# Patient Record
Sex: MALE | Race: WHITE | NOT HISPANIC OR LATINO | URBAN - METROPOLITAN AREA
[De-identification: names, ages, dates, MRNs, and addresses within clinical notes are randomized per-mention and may not be internally consistent; named-entity substitution may affect disease eponyms.]

---

## 2020-10-19 ENCOUNTER — EMERGENCY (EMERGENCY)
Facility: HOSPITAL | Age: 50
LOS: 1 days | Discharge: ANOTHER HOSPITAL | End: 2020-10-19
Attending: EMERGENCY MEDICINE
Payer: COMMERCIAL

## 2020-10-19 ENCOUNTER — INPATIENT (INPATIENT)
Facility: HOSPITAL | Age: 50
LOS: 1 days | Discharge: HOME | End: 2020-10-21
Attending: INTERNAL MEDICINE | Admitting: INTERNAL MEDICINE
Payer: COMMERCIAL

## 2020-10-19 VITALS
OXYGEN SATURATION: 98 % | DIASTOLIC BLOOD PRESSURE: 87 MMHG | SYSTOLIC BLOOD PRESSURE: 143 MMHG | HEART RATE: 92 BPM | RESPIRATION RATE: 16 BRPM

## 2020-10-19 VITALS
DIASTOLIC BLOOD PRESSURE: 102 MMHG | RESPIRATION RATE: 18 BRPM | HEART RATE: 95 BPM | HEIGHT: 71 IN | TEMPERATURE: 98 F | WEIGHT: 249.12 LBS | SYSTOLIC BLOOD PRESSURE: 158 MMHG | OXYGEN SATURATION: 97 %

## 2020-10-19 VITALS — HEIGHT: 70 IN | WEIGHT: 250 LBS

## 2020-10-19 DIAGNOSIS — Z98.890 OTHER SPECIFIED POSTPROCEDURAL STATES: Chronic | ICD-10-CM

## 2020-10-19 DIAGNOSIS — I21.4 NON-ST ELEVATION (NSTEMI) MYOCARDIAL INFARCTION: ICD-10-CM

## 2020-10-19 LAB
ALBUMIN SERPL ELPH-MCNC: 4 G/DL — SIGNIFICANT CHANGE UP (ref 3.5–5)
ALP SERPL-CCNC: 104 U/L — SIGNIFICANT CHANGE UP (ref 40–120)
ALT FLD-CCNC: 50 U/L DA — SIGNIFICANT CHANGE UP (ref 10–60)
ANION GAP SERPL CALC-SCNC: 8 MMOL/L — SIGNIFICANT CHANGE UP (ref 5–17)
APTT BLD: 29.2 SEC — SIGNIFICANT CHANGE UP (ref 27.5–35.5)
AST SERPL-CCNC: 91 U/L — HIGH (ref 10–40)
BASOPHILS # BLD AUTO: 0.03 K/UL — SIGNIFICANT CHANGE UP (ref 0–0.2)
BASOPHILS NFR BLD AUTO: 0.2 % — SIGNIFICANT CHANGE UP (ref 0–2)
BILIRUB SERPL-MCNC: 1 MG/DL — SIGNIFICANT CHANGE UP (ref 0.2–1.2)
BUN SERPL-MCNC: 19 MG/DL — HIGH (ref 7–18)
CALCIUM SERPL-MCNC: 9.3 MG/DL — SIGNIFICANT CHANGE UP (ref 8.4–10.5)
CHLORIDE SERPL-SCNC: 99 MMOL/L — SIGNIFICANT CHANGE UP (ref 96–108)
CK SERPL-CCNC: 824 U/L — HIGH (ref 35–232)
CO2 SERPL-SCNC: 26 MMOL/L — SIGNIFICANT CHANGE UP (ref 22–31)
CREAT SERPL-MCNC: 1.16 MG/DL — SIGNIFICANT CHANGE UP (ref 0.5–1.3)
EOSINOPHIL # BLD AUTO: 0.06 K/UL — SIGNIFICANT CHANGE UP (ref 0–0.5)
EOSINOPHIL NFR BLD AUTO: 0.5 % — SIGNIFICANT CHANGE UP (ref 0–6)
GLUCOSE BLDC GLUCOMTR-MCNC: 202 MG/DL — HIGH (ref 70–99)
GLUCOSE BLDC GLUCOMTR-MCNC: 259 MG/DL — HIGH (ref 70–99)
GLUCOSE SERPL-MCNC: 337 MG/DL — HIGH (ref 70–99)
HCT VFR BLD CALC: 47.8 % — SIGNIFICANT CHANGE UP (ref 39–50)
HGB BLD-MCNC: 16.7 G/DL — SIGNIFICANT CHANGE UP (ref 13–17)
IMM GRANULOCYTES NFR BLD AUTO: 0.2 % — SIGNIFICANT CHANGE UP (ref 0–1.5)
INR BLD: 0.89 RATIO — SIGNIFICANT CHANGE UP (ref 0.88–1.16)
LYMPHOCYTES # BLD AUTO: 2.52 K/UL — SIGNIFICANT CHANGE UP (ref 1–3.3)
LYMPHOCYTES # BLD AUTO: 20.6 % — SIGNIFICANT CHANGE UP (ref 13–44)
MCHC RBC-ENTMCNC: 29.1 PG — SIGNIFICANT CHANGE UP (ref 27–34)
MCHC RBC-ENTMCNC: 34.9 GM/DL — SIGNIFICANT CHANGE UP (ref 32–36)
MCV RBC AUTO: 83.4 FL — SIGNIFICANT CHANGE UP (ref 80–100)
MONOCYTES # BLD AUTO: 0.79 K/UL — SIGNIFICANT CHANGE UP (ref 0–0.9)
MONOCYTES NFR BLD AUTO: 6.5 % — SIGNIFICANT CHANGE UP (ref 2–14)
NEUTROPHILS # BLD AUTO: 8.79 K/UL — HIGH (ref 1.8–7.4)
NEUTROPHILS NFR BLD AUTO: 72 % — SIGNIFICANT CHANGE UP (ref 43–77)
NRBC # BLD: 0 /100 WBCS — SIGNIFICANT CHANGE UP (ref 0–0)
PLATELET # BLD AUTO: 237 K/UL — SIGNIFICANT CHANGE UP (ref 150–400)
POTASSIUM SERPL-MCNC: 4 MMOL/L — SIGNIFICANT CHANGE UP (ref 3.5–5.3)
POTASSIUM SERPL-SCNC: 4 MMOL/L — SIGNIFICANT CHANGE UP (ref 3.5–5.3)
PROT SERPL-MCNC: 8.4 G/DL — HIGH (ref 6–8.3)
PROTHROM AB SERPL-ACNC: 10.6 SEC — SIGNIFICANT CHANGE UP (ref 10.6–13.6)
RBC # BLD: 5.73 M/UL — SIGNIFICANT CHANGE UP (ref 4.2–5.8)
RBC # FLD: 13 % — SIGNIFICANT CHANGE UP (ref 10.3–14.5)
SARS-COV-2 RNA SPEC QL NAA+PROBE: SIGNIFICANT CHANGE UP
SODIUM SERPL-SCNC: 133 MMOL/L — LOW (ref 135–145)
TROPONIN I SERPL-MCNC: 13.5 NG/ML — HIGH (ref 0–0.04)
WBC # BLD: 12.21 K/UL — HIGH (ref 3.8–10.5)
WBC # FLD AUTO: 12.21 K/UL — HIGH (ref 3.8–10.5)

## 2020-10-19 PROCEDURE — 92941 PRQ TRLML REVSC TOT OCCL AMI: CPT | Mod: LC

## 2020-10-19 PROCEDURE — 93005 ELECTROCARDIOGRAM TRACING: CPT

## 2020-10-19 PROCEDURE — 87635 SARS-COV-2 COVID-19 AMP PRB: CPT

## 2020-10-19 PROCEDURE — 93010 ELECTROCARDIOGRAM REPORT: CPT | Mod: 77

## 2020-10-19 PROCEDURE — 36415 COLL VENOUS BLD VENIPUNCTURE: CPT

## 2020-10-19 PROCEDURE — 80053 COMPREHEN METABOLIC PANEL: CPT

## 2020-10-19 PROCEDURE — 85610 PROTHROMBIN TIME: CPT

## 2020-10-19 PROCEDURE — 85025 COMPLETE CBC W/AUTO DIFF WBC: CPT

## 2020-10-19 PROCEDURE — 71046 X-RAY EXAM CHEST 2 VIEWS: CPT

## 2020-10-19 PROCEDURE — 99152 MOD SED SAME PHYS/QHP 5/>YRS: CPT

## 2020-10-19 PROCEDURE — 82550 ASSAY OF CK (CPK): CPT

## 2020-10-19 PROCEDURE — 93458 L HRT ARTERY/VENTRICLE ANGIO: CPT | Mod: 26,59

## 2020-10-19 PROCEDURE — 99291 CRITICAL CARE FIRST HOUR: CPT | Mod: 25

## 2020-10-19 PROCEDURE — 99291 CRITICAL CARE FIRST HOUR: CPT

## 2020-10-19 PROCEDURE — 85730 THROMBOPLASTIN TIME PARTIAL: CPT

## 2020-10-19 PROCEDURE — 84484 ASSAY OF TROPONIN QUANT: CPT

## 2020-10-19 PROCEDURE — 71046 X-RAY EXAM CHEST 2 VIEWS: CPT | Mod: 26

## 2020-10-19 PROCEDURE — 93010 ELECTROCARDIOGRAM REPORT: CPT | Mod: 59

## 2020-10-19 RX ORDER — SODIUM CHLORIDE 9 MG/ML
1000 INJECTION, SOLUTION INTRAVENOUS
Refills: 0 | Status: DISCONTINUED | OUTPATIENT
Start: 2020-10-19 | End: 2020-10-21

## 2020-10-19 RX ORDER — ACETAMINOPHEN 500 MG
650 TABLET ORAL ONCE
Refills: 0 | Status: COMPLETED | OUTPATIENT
Start: 2020-10-19 | End: 2020-10-19

## 2020-10-19 RX ORDER — INSULIN LISPRO 100/ML
VIAL (ML) SUBCUTANEOUS
Refills: 0 | Status: DISCONTINUED | OUTPATIENT
Start: 2020-10-19 | End: 2020-10-20

## 2020-10-19 RX ORDER — GLUCAGON INJECTION, SOLUTION 0.5 MG/.1ML
1 INJECTION, SOLUTION SUBCUTANEOUS ONCE
Refills: 0 | Status: DISCONTINUED | OUTPATIENT
Start: 2020-10-19 | End: 2020-10-21

## 2020-10-19 RX ORDER — DEXTROSE 50 % IN WATER 50 %
25 SYRINGE (ML) INTRAVENOUS ONCE
Refills: 0 | Status: DISCONTINUED | OUTPATIENT
Start: 2020-10-19 | End: 2020-10-21

## 2020-10-19 RX ORDER — TICAGRELOR 90 MG/1
90 TABLET ORAL EVERY 12 HOURS
Refills: 0 | Status: DISCONTINUED | OUTPATIENT
Start: 2020-10-20 | End: 2020-10-21

## 2020-10-19 RX ORDER — DEXTROSE 50 % IN WATER 50 %
12.5 SYRINGE (ML) INTRAVENOUS ONCE
Refills: 0 | Status: DISCONTINUED | OUTPATIENT
Start: 2020-10-19 | End: 2020-10-21

## 2020-10-19 RX ORDER — ASPIRIN/CALCIUM CARB/MAGNESIUM 324 MG
162 TABLET ORAL ONCE
Refills: 0 | Status: COMPLETED | OUTPATIENT
Start: 2020-10-19 | End: 2020-10-19

## 2020-10-19 RX ORDER — SODIUM CHLORIDE 9 MG/ML
3 INJECTION INTRAMUSCULAR; INTRAVENOUS; SUBCUTANEOUS EVERY 8 HOURS
Refills: 0 | Status: DISCONTINUED | OUTPATIENT
Start: 2020-10-19 | End: 2020-10-21

## 2020-10-19 RX ORDER — DEXTROSE 50 % IN WATER 50 %
15 SYRINGE (ML) INTRAVENOUS ONCE
Refills: 0 | Status: DISCONTINUED | OUTPATIENT
Start: 2020-10-19 | End: 2020-10-21

## 2020-10-19 RX ORDER — ATORVASTATIN CALCIUM 80 MG/1
80 TABLET, FILM COATED ORAL ONCE
Refills: 0 | Status: COMPLETED | OUTPATIENT
Start: 2020-10-19 | End: 2020-10-19

## 2020-10-19 RX ORDER — SODIUM CHLORIDE 9 MG/ML
1000 INJECTION INTRAMUSCULAR; INTRAVENOUS; SUBCUTANEOUS
Refills: 0 | Status: DISCONTINUED | OUTPATIENT
Start: 2020-10-19 | End: 2020-10-21

## 2020-10-19 RX ORDER — ASPIRIN/CALCIUM CARB/MAGNESIUM 324 MG
81 TABLET ORAL DAILY
Refills: 0 | Status: DISCONTINUED | OUTPATIENT
Start: 2020-10-19 | End: 2020-10-21

## 2020-10-19 RX ORDER — METOPROLOL TARTRATE 50 MG
25 TABLET ORAL
Refills: 0 | Status: DISCONTINUED | OUTPATIENT
Start: 2020-10-19 | End: 2020-10-21

## 2020-10-19 RX ORDER — ATORVASTATIN CALCIUM 80 MG/1
80 TABLET, FILM COATED ORAL AT BEDTIME
Refills: 0 | Status: DISCONTINUED | OUTPATIENT
Start: 2020-10-19 | End: 2020-10-21

## 2020-10-19 RX ORDER — INSULIN LISPRO 100/ML
VIAL (ML) SUBCUTANEOUS AT BEDTIME
Refills: 0 | Status: DISCONTINUED | OUTPATIENT
Start: 2020-10-19 | End: 2020-10-20

## 2020-10-19 RX ADMIN — ATORVASTATIN CALCIUM 80 MILLIGRAM(S): 80 TABLET, FILM COATED ORAL at 22:24

## 2020-10-19 RX ADMIN — ATORVASTATIN CALCIUM 80 MILLIGRAM(S): 80 TABLET, FILM COATED ORAL at 14:41

## 2020-10-19 RX ADMIN — Medication 25 MILLIGRAM(S): at 18:57

## 2020-10-19 RX ADMIN — Medication 162 MILLIGRAM(S): at 14:41

## 2020-10-19 RX ADMIN — Medication 2: at 18:54

## 2020-10-19 RX ADMIN — Medication 650 MILLIGRAM(S): at 23:20

## 2020-10-19 RX ADMIN — SODIUM CHLORIDE 75 MILLILITER(S): 9 INJECTION INTRAMUSCULAR; INTRAVENOUS; SUBCUTANEOUS at 18:46

## 2020-10-19 RX ADMIN — Medication 1: at 21:56

## 2020-10-19 RX ADMIN — Medication 650 MILLIGRAM(S): at 22:21

## 2020-10-19 RX ADMIN — SODIUM CHLORIDE 3 MILLILITER(S): 9 INJECTION INTRAMUSCULAR; INTRAVENOUS; SUBCUTANEOUS at 21:36

## 2020-10-19 NOTE — ED ADULT NURSE NOTE - CHPI ED NUR SYMPTOMS NEG
no fever/no syncope/no shortness of breath/no chills/no congestion/no diaphoresis/no nausea/no vomiting/no dizziness/no back pain

## 2020-10-19 NOTE — ED ADULT TRIAGE NOTE - CHIEF COMPLAINT QUOTE
sent from urgent care for abnormal ekg . pt reports chest discomfort  x 5 days seen  previously at the urgent care  4 days ago went back today  states  the discomfort radiated  from  the chest to the jaw

## 2020-10-19 NOTE — ED PROVIDER NOTE - PROGRESS NOTE DETAILS
Patient having mild intermittent L sided chest pain, last episode 10 minutes ago. Repeat EKG. Case discussed with Dr. Murrieta; will transfer patient for cath.

## 2020-10-19 NOTE — H&P CARDIOLOGY - COMMENTS
1. NSTEMI  - Plan for cardiac cath today (pt consented, risks/benefits explained, questions answered). Continue ASA and statin therapy. Will start DAPT if stent placed. Likely eventual beta blocker and ACE.  2. Hypertensive urgency  - Start Metoprolol 25mg PO BID. Uptitrate as needed. Eventual ACE. Monitor BP serially. Low sodium diet.  3. Diabetes mellitus  - Likely undiagnosed DM. Check A1C. Start insulin sliding scale and monitor finger sticks. May require endo consult.

## 2020-10-19 NOTE — CONSULT NOTE ADULT - PROBLEM SELECTOR RECOMMENDATION 9
Chest pain ACS-NSTEMI  Elevated troponins with ECG changes and active angina  Transfer for urgent Cardiac cath-EMERY Baum  Aspirin,statins

## 2020-10-19 NOTE — CHART NOTE - NSCHARTNOTEFT_GEN_A_CORE
Status post Cath, Right Radial site without bleeding or hematoma.  Dressing is intact.  Positive Pulses, Capillary refill less than two seconds.  Will continue to monitor.                                                                                                                                                  JUAN Khan, RPA-C [id number]

## 2020-10-19 NOTE — H&P CARDIOLOGY - RS GEN PE MLT RESP DETAILS PC
good air movement/airway patent/clear to auscultation bilaterally/no rhonchi/no wheezes/no intercostal retractions/no chest wall tenderness/breath sounds equal/no rales/respirations non-labored

## 2020-10-19 NOTE — ED PROVIDER NOTE - CLINICAL SUMMARY MEDICAL DECISION MAKING FREE TEXT BOX
49 y/o M presents with chest pain for a few days. Troponin is very elevated. Will consult cardiology.

## 2020-10-19 NOTE — ED PROVIDER NOTE - OBJECTIVE STATEMENT
49 y/o M with no known PMHx, has not seen his doctor in a few years, presents to the ED with several days of chest pain. Patient went to urgent care a few days ago, had a normal EKG and was sent for an outpatient echo, which he was told was normal. Patient also scheduled for an outpatient cardiology appointment tomorrow morning but went back to urgent care today for persisted pain. Patient had EKG repeated which was "possibly abnormal" and was then sent to the ER. 51 y/o M with no known PMHx, has not seen his doctor in a few years, presents to the ED with several days of chest pain. Patient went to urgent care a few days ago, had a normal EKG and was sent for an outpatient echo, which he was told was normal. Patient also scheduled for an outpatient cardiology appointment tomorrow morning but went back to urgent care today for persisted pain and now pain in both sides of jaw.  Patient had EKG repeated which was "possibly abnormal" and was then sent to the ER.

## 2020-10-19 NOTE — CONSULT NOTE ADULT - SUBJECTIVE AND OBJECTIVE BOX
DATE OF SERVICE: 10/19/2020  Patient was seen,examined and evaluated  by me.    CHIEF COMPLAINT:Chest Pain     HPI:  51 y/o M with no known PMHx, has not seen his doctor in a few years, presents to the ED with several days of chest pain. Patient went to urgent care a few days ago, had a normal EKG and was sent for an outpatient echo, which he was told was normal. Patient also scheduled for an outpatient cardiology appointment tomorrow morning but went back to urgent care today for persisted pain and now pain in both sides of jaw.     PAST MEDICAL & SURGICAL HISTORY:  Tobacco abuse    S/P arthroscopy of right knee    S/P correction of deviated nasal septum        MEDICATIONS  (STANDING):    MEDICATIONS  (PRN):      FAMILY HISTORY:  Family history of MI (myocardial infarction) (Father)      No family history of premature coronary artery disease or sudden cardiac death    SOCIAL HISTORY:  Smoking-[ ] Active  [ ] Former [ ] Non Smoker  Alcohol-[ ] Denies [ ] Social [ ] Daily  Ilicit Drug use-[ ] Denies [ ] Active user    REVIEW OF SYSTEMS:  Constitutional: [ ] fever, [ ]weight loss, [ ]fatigue   Activity [ ] Bedbound,[ ] Ambulates [ ] Unassisted[ ] Cane/Walker [ ] Assistence.  Effort tolerance:[ ] Excellent [ ] Good [ ] Fair [ ] Poor [ ]  Eyes: [ ] visual changes  Respiratory: [ ]shortness of breath;  [ ] cough, [ ]wheezing, [ ]chills, [ ]hemoptysis  Cardiovascular: [ ] chest pain, [ ]palpitations, [ ]dizziness,  [ ]leg swelling[ ]orthopnea [ ]PND  Gastrointestinal: [ ] abdominal pain, [ ]nausea, [ ]vomiting,  [ ]diarrhea,[ ]constipation  Genitourinary: [ ] dysuria, [ ] hematuria  Neurologic: [ ] headaches [ ] tremors[ ] weakness  Skin: [ ] itching, [ ]burning, [ ] rashes  Endocrine: [ ] heat or cold intolerance  Musculoskeletal: [ ] joint pain or swelling; [ ] muscle, back, or extremity pain  Psychiatric: [ ] depression, [ ]anxiety, [ ]mood swings, or [ ]difficulty sleeping  Hematologic: [ ] easy bruising, [ ] bleeding gums       [ x] All others negative	  [ ] Unable to obtain    Vital Signs Last 24 Hrs  T(C): 36.6 (19 Oct 2020 14:05), Max: 36.7 (19 Oct 2020 13:06)  T(F): 97.8 (19 Oct 2020 14:05), Max: 98.1 (19 Oct 2020 13:06)  HR: 92 (19 Oct 2020 14:47) (92 - 96)  BP: 143/87 (19 Oct 2020 14:47) (136/82 - 158/102)  BP(mean): --  RR: 16 (19 Oct 2020 14:47) (16 - 18)  SpO2: 98% (19 Oct 2020 14:47) (97% - 98%)  I&O's Summary      PHYSICAL EXAM:  General: No acute distress BMI-  HEENT: EOMI, PERRL[ ] Icteric  Neck: Supple, No JVD  Lungs: Equal air entry bilaterally; [ ] Rales [ ] Rhonchi [ ] Wheezing  Heart: Regular rate and rhythm;[ ] Murmurs-   /6 [ ] Systolic [ ] Diastolic [ ] Radiation,No rubs, or gallops  Abdomen: Nontender, bowel sounds present  Extremities: No clubbing, cyanosis, or edema[ ] Calf tenderness  Nervous system:  Alert & Oriented X3, no focal deficits  Psychiatric: Normal affect  Skin: No rashes or lesions      LABS:  10-19    133<L>  |  99  |  19<H>  ----------------------------<  337<H>  4.0   |  26  |  1.16    Ca    9.3      19 Oct 2020 13:43    TPro  8.4<H>  /  Alb  4.0  /  TBili  1.0  /  DBili  x   /  AST  91<H>  /  ALT  50  /  AlkPhos  104  10-19    Creatinine Trend: 1.16<--                        16.7   12.21 )-----------( 237      ( 19 Oct 2020 13:43 )             47.8     PT/INR - ( 19 Oct 2020 13:43 )   PT: 10.6 sec;   INR: 0.89 ratio         PTT - ( 19 Oct 2020 13:43 )  PTT:29.2 sec    Lipid Panel:   Cardiac Enzymes: CARDIAC MARKERS ( 19 Oct 2020 13:43 )  13.500 ng/mL / x     / 824 U/L / x     / x                RADIOLOGY:    ECG [my interpretation]:    TELEMETRY:    ECHO:    STRESS TEST:    CATHETERIZATION: DATE OF SERVICE: 10/19/2020  Patient was seen,examined and evaluated  by me.    CHIEF COMPLAINT:Chest Pain     HPI:51 y/o M with no known PMHx, has not seen his doctor in a few years, presents to the ED with several days of chest pain. Patient went to urgent care a few days ago, had a normal EKG and was sent for an outpatient echo, which he was told was normal. Patient also scheduled for an outpatient cardiology appointment tomorrow morning but went back to urgent care today for persisted chest  pain and now pain in both sides of jaw. ECG revealed ST depressions refers to anterior chest pressure,initial Troponins elevated at 13,    PAST MEDICAL & SURGICAL HISTORY:  Tobacco abuse  S/P arthroscopy of right knee  S/P correction of deviated nasal septum    ALLERGIES AND HOME MEDICATIONS:   Allergies:        Allergies:No Known Allergies:     Home Medications:   None      FAMILY HISTORY:  Family history of MI (myocardial infarction) (Father)  No family history of premature coronary artery disease or sudden cardiac death    SOCIAL HISTORY:  Smoking-[ ] Active  [x ] Former [ ] Non Smoker  Alcohol-[ ] Denies [x ] Social [ ] Daily  Ilicit Drug use-[x ] Denies [ ] Active user    REVIEW OF SYSTEMS:  Constitutional: [ ] fever, [ ]weight loss, [ ]fatigue   Activity [ ] Bedbound,[x ] Ambulates [x ] Unassisted[ ] Cane/Walker [ ] Assistence.  Effort tolerance:[x ] Excellent [ ] Good [ ] Fair [ ] Poor [ ]  Eyes: [ ] visual changes  Respiratory: [x ]shortness of breath;  [ ] cough, [ ]wheezing, [ ]chills, [ ]hemoptysis  Cardiovascular: [x ] chest pain, [ ]palpitations, [ ]dizziness,  [ ]leg swelling[ ]orthopnea [ ]PND  Gastrointestinal: [ ] abdominal pain, [ ]nausea, [ ]vomiting,  [ ]diarrhea,[ ]constipation  Genitourinary: [ ] dysuria, [ ] hematuria  Neurologic: [ ] headaches [ ] tremors[ ] weakness  Skin: [ ] itching, [ ]burning, [ ] rashes  Endocrine: [ ] heat or cold intolerance  Musculoskeletal: [ ] joint pain or swelling; [ ] muscle, back, or extremity pain  Psychiatric: [ ] depression, [ ]anxiety, [ ]mood swings, or [ ]difficulty sleeping  Hematologic: [ ] easy bruising, [ ] bleeding gums       [ x] All others negative	  [ ] Unable to obtain    Vital Signs Last 24 Hrs  T(C): 36.6 (19 Oct 2020 14:05), Max: 36.7 (19 Oct 2020 13:06)  T(F): 97.8 (19 Oct 2020 14:05), Max: 98.1 (19 Oct 2020 13:06)  HR: 92 (19 Oct 2020 14:47) (92 - 96)  BP: 143/87 (19 Oct 2020 14:47) (136/82 - 158/102)  RR: 16 (19 Oct 2020 14:47) (16 - 18)  SpO2: 98% (19 Oct 2020 14:47) (97% - 98%)  I&O's Summary      PHYSICAL EXAM:  General: No acute distress BMI-34.8  HEENT: EOMI, PERRL[ ] Icteric  Neck: Supple, No JVD  Lungs: Equal air entry bilaterally; [ ] Rales [ ] Rhonchi [ ] Wheezing  Heart: Regular rate and rhythm;[x ] Murmurs-   2/6 [x ] Systolic [ ] Diastolic [ ] Radiation,No rubs, or gallops  Abdomen: Nontender, bowel sounds present  Extremities: No clubbing, cyanosis, or edema[ ] Calf tenderness  Nervous system:  Alert & Oriented X3, no focal deficits  Psychiatric: Normal affect  Skin: No rashes or lesions      LABS:  10-19    133<L>  |  99  |  19<H>  ----------------------------<  337<H>  4.0   |  26  |  1.16    Ca    9.3      19 Oct 2020 13:43    TPro  8.4<H>  /  Alb  4.0  /  TBili  1.0  /  DBili  x   /  AST  91<H>  /  ALT  50  /  AlkPhos  104  10-19    Creatinine Trend: 1.16<--                        16.7   12.21 )-----------( 237      ( 19 Oct 2020 13:43 )             47.8     PT/INR - ( 19 Oct 2020 13:43 )   PT: 10.6 sec;   INR: 0.89 ratio         PTT - ( 19 Oct 2020 13:43 )  PTT:29.2 sec    Cardiac Enzymes: CARDIAC MARKERS ( 19 Oct 2020 13:43 )  13.500 ng/mL / x     / 824 U/L / x     / x                RADIOLOGY XR CHEST PA LAT 2V            IMPRESSION:  The lungs are clear.  No pleural abnormality is seen.  The heart and mediastinum appear intact.    ECG [my interpretation]:Sinus rhythm at 99 BPM ST depression V2-3    TELEMETRY:Sinus rhythm sinus tachycardia

## 2020-10-19 NOTE — H&P CARDIOLOGY - HISTORY OF PRESENT ILLNESS
49 y/o male, former tobacco abuse, with no significant PMHx, not currently taking any medications, presented to [facility] with chest pain. Pt initially developed a mild, non-pleuritic, non-exertional, non-radiating, left and right sided chest pain 4 days ago on Thursday, October 15. Pt cannot recall what provoked his chest pain, although he did a lot of strenuous physical activity at his job last week. Pt reports that his chest pain worsened slightly the next day, which caused him to go to urgent care. At that time, his EKG was normal and was referred to a cardiologist and prescribed Flexeril, as it was initially thought that his chest pain was muscular. Throughout the weekend, his chest pain worsened to a maximum intensity of 6/10. Pt still cannot recall if anything specifically provoked his chest pain as he denies any exertional or positional component. Pt went back to urgent care this morning and his EKG reportedly had new changes, which is why he was referred to [facility] for evaluation. Pt has never had a stress test or angiogram in the past. Pt does not have any chest pain at this time. Pt denies fever, chills, recent travel, headache, dizziness, visual deficits, shortness of breath, orthopnea, palpitations, abdominal pain, N/V/D/C, hematochezia, melena, dysuria, hematuria, LOC, syncope, peripheral edema. Upon arrival to [facility], EKG revealed NSR with ST depressions in the anterior leads with a troponin I of 13.5 and . Pt was given ASA and transferred to [facility] for cardiac catheterization.

## 2020-10-19 NOTE — H&P CARDIOLOGY - NEUROLOGICAL DETAILS
responds to verbal commands/alert and oriented x 3/responds to pain/normal strength/sensation intact/cranial nerves intact

## 2020-10-19 NOTE — ED ADULT NURSE NOTE - OBJECTIVE STATEMENT
As per pt, c/o generalize CP since friday worsening today with localized L sided CP since this morning and jaw pain since last night pain radiating to BL UE L>R. As per pt, he went to [place] urgent care today and was sent to the hospital from urgent care for abnormal EKG today. As per pt, the CP is pressure-like. Pt denies SOB, n/v/d, cough, f/c, H/A, dizziness, or blurred vision.

## 2020-10-19 NOTE — H&P CARDIOLOGY - NEGATIVE CARDIOVASCULAR SYMPTOMS
no peripheral edema/no orthopnea/no paroxysmal nocturnal dyspnea/no dyspnea on exertion/no palpitations/no claudication

## 2020-10-19 NOTE — CONSULT NOTE ADULT - ATTENDING COMMENTS
Patient was seen and examined by me on 10/19/2020,interim events noted,labs and radiology studies reviewed.  Derik Murrieta MD,FACC.  [address] [place] [address].  [id number].  [phone]

## 2020-10-20 ENCOUNTER — TRANSCRIPTION ENCOUNTER (OUTPATIENT)
Age: 50
End: 2020-10-20

## 2020-10-20 DIAGNOSIS — E11.65 TYPE 2 DIABETES MELLITUS WITH HYPERGLYCEMIA: ICD-10-CM

## 2020-10-20 DIAGNOSIS — I16.0 HYPERTENSIVE URGENCY: ICD-10-CM

## 2020-10-20 DIAGNOSIS — I10 ESSENTIAL (PRIMARY) HYPERTENSION: ICD-10-CM

## 2020-10-20 DIAGNOSIS — E78.49 OTHER HYPERLIPIDEMIA: ICD-10-CM

## 2020-10-20 DIAGNOSIS — I21.4 NON-ST ELEVATION (NSTEMI) MYOCARDIAL INFARCTION: ICD-10-CM

## 2020-10-20 LAB
ALBUMIN SERPL ELPH-MCNC: 4.3 G/DL — SIGNIFICANT CHANGE UP (ref 3.3–5)
ALP SERPL-CCNC: 90 U/L — SIGNIFICANT CHANGE UP (ref 40–120)
ALT FLD-CCNC: 33 U/L — SIGNIFICANT CHANGE UP (ref 4–41)
ANION GAP SERPL CALC-SCNC: 16 MMO/L — HIGH (ref 7–14)
ANION GAP SERPL CALC-SCNC: 17 MMO/L — HIGH (ref 7–14)
AST SERPL-CCNC: 60 U/L — HIGH (ref 4–40)
B-OH-BUTYR SERPL-SCNC: 0.5 MMOL/L — HIGH (ref 0–0.4)
BASOPHILS # BLD AUTO: 0.02 K/UL — SIGNIFICANT CHANGE UP (ref 0–0.2)
BASOPHILS NFR BLD AUTO: 0.2 % — SIGNIFICANT CHANGE UP (ref 0–2)
BILIRUB SERPL-MCNC: 1.7 MG/DL — HIGH (ref 0.2–1.2)
BUN SERPL-MCNC: 15 MG/DL — SIGNIFICANT CHANGE UP (ref 7–23)
BUN SERPL-MCNC: 19 MG/DL — SIGNIFICANT CHANGE UP (ref 7–23)
CALCIUM SERPL-MCNC: 8.8 MG/DL — SIGNIFICANT CHANGE UP (ref 8.4–10.5)
CALCIUM SERPL-MCNC: 9.2 MG/DL — SIGNIFICANT CHANGE UP (ref 8.4–10.5)
CHLORIDE SERPL-SCNC: 100 MMOL/L — SIGNIFICANT CHANGE UP (ref 98–107)
CHLORIDE SERPL-SCNC: 97 MMOL/L — LOW (ref 98–107)
CHOLEST SERPL-MCNC: 226 MG/DL — HIGH (ref 120–199)
CO2 SERPL-SCNC: 20 MMOL/L — LOW (ref 22–31)
CO2 SERPL-SCNC: 20 MMOL/L — LOW (ref 22–31)
CREAT SERPL-MCNC: 0.8 MG/DL — SIGNIFICANT CHANGE UP (ref 0.5–1.3)
CREAT SERPL-MCNC: 0.83 MG/DL — SIGNIFICANT CHANGE UP (ref 0.5–1.3)
EOSINOPHIL # BLD AUTO: 0.07 K/UL — SIGNIFICANT CHANGE UP (ref 0–0.5)
EOSINOPHIL NFR BLD AUTO: 0.6 % — SIGNIFICANT CHANGE UP (ref 0–6)
GLUCOSE BLDC GLUCOMTR-MCNC: 178 MG/DL — HIGH (ref 70–99)
GLUCOSE BLDC GLUCOMTR-MCNC: 230 MG/DL — HIGH (ref 70–99)
GLUCOSE BLDC GLUCOMTR-MCNC: 277 MG/DL — HIGH (ref 70–99)
GLUCOSE BLDC GLUCOMTR-MCNC: 305 MG/DL — HIGH (ref 70–99)
GLUCOSE SERPL-MCNC: 222 MG/DL — HIGH (ref 70–99)
GLUCOSE SERPL-MCNC: 255 MG/DL — HIGH (ref 70–99)
HBA1C BLD-MCNC: 11.2 % — HIGH (ref 4–5.6)
HCT VFR BLD CALC: 47.5 % — SIGNIFICANT CHANGE UP (ref 39–50)
HDLC SERPL-MCNC: 38 MG/DL — SIGNIFICANT CHANGE UP (ref 35–55)
HGB BLD-MCNC: 16 G/DL — SIGNIFICANT CHANGE UP (ref 13–17)
IMM GRANULOCYTES NFR BLD AUTO: 0.3 % — SIGNIFICANT CHANGE UP (ref 0–1.5)
LACTATE BLDV-MCNC: 1.4 MMOL/L — SIGNIFICANT CHANGE UP (ref 0.5–2)
LIPID PNL WITH DIRECT LDL SERPL: 152 MG/DL — SIGNIFICANT CHANGE UP
LYMPHOCYTES # BLD AUTO: 2.58 K/UL — SIGNIFICANT CHANGE UP (ref 1–3.3)
LYMPHOCYTES # BLD AUTO: 22.2 % — SIGNIFICANT CHANGE UP (ref 13–44)
MAGNESIUM SERPL-MCNC: 1.9 MG/DL — SIGNIFICANT CHANGE UP (ref 1.6–2.6)
MAGNESIUM SERPL-MCNC: 2 MG/DL — SIGNIFICANT CHANGE UP (ref 1.6–2.6)
MCHC RBC-ENTMCNC: 28.1 PG — SIGNIFICANT CHANGE UP (ref 27–34)
MCHC RBC-ENTMCNC: 33.7 % — SIGNIFICANT CHANGE UP (ref 32–36)
MCV RBC AUTO: 83.3 FL — SIGNIFICANT CHANGE UP (ref 80–100)
MONOCYTES # BLD AUTO: 0.98 K/UL — HIGH (ref 0–0.9)
MONOCYTES NFR BLD AUTO: 8.4 % — SIGNIFICANT CHANGE UP (ref 2–14)
NEUTROPHILS # BLD AUTO: 7.91 K/UL — HIGH (ref 1.8–7.4)
NEUTROPHILS NFR BLD AUTO: 68.3 % — SIGNIFICANT CHANGE UP (ref 43–77)
NRBC # FLD: 0 K/UL — SIGNIFICANT CHANGE UP (ref 0–0)
PHOSPHATE SERPL-MCNC: 2.8 MG/DL — SIGNIFICANT CHANGE UP (ref 2.5–4.5)
PHOSPHATE SERPL-MCNC: 3.1 MG/DL — SIGNIFICANT CHANGE UP (ref 2.5–4.5)
PLATELET # BLD AUTO: 262 K/UL — SIGNIFICANT CHANGE UP (ref 150–400)
PMV BLD: 8.8 FL — SIGNIFICANT CHANGE UP (ref 7–13)
POTASSIUM SERPL-MCNC: 3.9 MMOL/L — SIGNIFICANT CHANGE UP (ref 3.5–5.3)
POTASSIUM SERPL-MCNC: 3.9 MMOL/L — SIGNIFICANT CHANGE UP (ref 3.5–5.3)
POTASSIUM SERPL-SCNC: 3.9 MMOL/L — SIGNIFICANT CHANGE UP (ref 3.5–5.3)
POTASSIUM SERPL-SCNC: 3.9 MMOL/L — SIGNIFICANT CHANGE UP (ref 3.5–5.3)
PROT SERPL-MCNC: 7.4 G/DL — SIGNIFICANT CHANGE UP (ref 6–8.3)
RBC # BLD: 5.7 M/UL — SIGNIFICANT CHANGE UP (ref 4.2–5.8)
RBC # FLD: 13.1 % — SIGNIFICANT CHANGE UP (ref 10.3–14.5)
SODIUM SERPL-SCNC: 134 MMOL/L — LOW (ref 135–145)
SODIUM SERPL-SCNC: 136 MMOL/L — SIGNIFICANT CHANGE UP (ref 135–145)
TRIGL SERPL-MCNC: 384 MG/DL — HIGH (ref 10–149)
TSH SERPL-MCNC: 2.54 UIU/ML — SIGNIFICANT CHANGE UP (ref 0.27–4.2)
WBC # BLD: 11.6 K/UL — HIGH (ref 3.8–10.5)
WBC # FLD AUTO: 11.6 K/UL — HIGH (ref 3.8–10.5)

## 2020-10-20 PROCEDURE — 93010 ELECTROCARDIOGRAM REPORT: CPT

## 2020-10-20 PROCEDURE — 93306 TTE W/DOPPLER COMPLETE: CPT | Mod: 26

## 2020-10-20 PROCEDURE — 99233 SBSQ HOSP IP/OBS HIGH 50: CPT

## 2020-10-20 PROCEDURE — 99152 MOD SED SAME PHYS/QHP 5/>YRS: CPT

## 2020-10-20 PROCEDURE — 92928 PRQ TCAT PLMT NTRAC ST 1 LES: CPT | Mod: LC

## 2020-10-20 PROCEDURE — 92978 ENDOLUMINL IVUS OCT C 1ST: CPT | Mod: 26,LC

## 2020-10-20 RX ORDER — INSULIN GLARGINE 100 [IU]/ML
22 INJECTION, SOLUTION SUBCUTANEOUS AT BEDTIME
Refills: 0 | Status: DISCONTINUED | OUTPATIENT
Start: 2020-10-20 | End: 2020-10-21

## 2020-10-20 RX ORDER — INSULIN LISPRO 100/ML
7 VIAL (ML) SUBCUTANEOUS
Refills: 0 | Status: DISCONTINUED | OUTPATIENT
Start: 2020-10-20 | End: 2020-10-21

## 2020-10-20 RX ORDER — INSULIN LISPRO 100/ML
VIAL (ML) SUBCUTANEOUS AT BEDTIME
Refills: 0 | Status: DISCONTINUED | OUTPATIENT
Start: 2020-10-20 | End: 2020-10-21

## 2020-10-20 RX ORDER — ACETAMINOPHEN 500 MG
650 TABLET ORAL ONCE
Refills: 0 | Status: COMPLETED | OUTPATIENT
Start: 2020-10-20 | End: 2020-10-20

## 2020-10-20 RX ORDER — SODIUM CHLORIDE 9 MG/ML
500 INJECTION INTRAMUSCULAR; INTRAVENOUS; SUBCUTANEOUS
Refills: 0 | Status: DISCONTINUED | OUTPATIENT
Start: 2020-10-20 | End: 2020-10-21

## 2020-10-20 RX ORDER — INSULIN LISPRO 100/ML
VIAL (ML) SUBCUTANEOUS
Refills: 0 | Status: DISCONTINUED | OUTPATIENT
Start: 2020-10-20 | End: 2020-10-21

## 2020-10-20 RX ADMIN — TICAGRELOR 90 MILLIGRAM(S): 90 TABLET ORAL at 18:06

## 2020-10-20 RX ADMIN — TICAGRELOR 90 MILLIGRAM(S): 90 TABLET ORAL at 05:36

## 2020-10-20 RX ADMIN — Medication 4: at 11:41

## 2020-10-20 RX ADMIN — Medication 2: at 21:54

## 2020-10-20 RX ADMIN — ATORVASTATIN CALCIUM 80 MILLIGRAM(S): 80 TABLET, FILM COATED ORAL at 21:54

## 2020-10-20 RX ADMIN — SODIUM CHLORIDE 3 MILLILITER(S): 9 INJECTION INTRAMUSCULAR; INTRAVENOUS; SUBCUTANEOUS at 05:27

## 2020-10-20 RX ADMIN — SODIUM CHLORIDE 3 MILLILITER(S): 9 INJECTION INTRAMUSCULAR; INTRAVENOUS; SUBCUTANEOUS at 21:27

## 2020-10-20 RX ADMIN — Medication 1: at 15:54

## 2020-10-20 RX ADMIN — Medication 25 MILLIGRAM(S): at 18:06

## 2020-10-20 RX ADMIN — SODIUM CHLORIDE 75 MILLILITER(S): 9 INJECTION INTRAMUSCULAR; INTRAVENOUS; SUBCUTANEOUS at 15:25

## 2020-10-20 RX ADMIN — Medication 81 MILLIGRAM(S): at 11:43

## 2020-10-20 RX ADMIN — INSULIN GLARGINE 22 UNIT(S): 100 INJECTION, SOLUTION SUBCUTANEOUS at 21:55

## 2020-10-20 RX ADMIN — Medication 25 MILLIGRAM(S): at 05:36

## 2020-10-20 RX ADMIN — Medication 650 MILLIGRAM(S): at 15:44

## 2020-10-20 RX ADMIN — Medication 2: at 08:05

## 2020-10-20 NOTE — CONSULT NOTE ADULT - ASSESSMENT
51 y/o male, former tobacco abuse, T2DM, CAD admitted for multivessel disease for staged PCI.   Found to have new onset T2DM.   Endocrine consulted for T2DM.

## 2020-10-20 NOTE — CHART NOTE - NSCHARTNOTEFT_GEN_A_CORE
Patient is s/p cardiac cath. Upon arrival patient without any complaints. Right radial site is stable, with some visible bruising with light blue discoloration surrounding dressing, patient reports bruising present after previous cath 10/19. Right radial site soft with slight swelling, warm, dry. No erythema. Dressing is clean/intact/dry. 2+ radial pulse and good rap refill. Denies any numbness, tingling, or tight sensation. Will monitor closely.    Gladys Cavanaugh, [facility]   Department of Medicine

## 2020-10-20 NOTE — PROGRESS NOTE ADULT - SUBJECTIVE AND OBJECTIVE BOX
DATE OF SERVICE:10/20/2020  Patient was seen and examined by me,interim events noted.    PRESENTING CC:Chest pain -NSTEMI    HPI and HOSPITAL COURSE: :49 y/o M with no known PMHx, has not seen his doctor in a few years, presents to [facility] ED with several days of chest pain.ECG revealed ST depressions refers to anterior chest pressure,initial Troponins elevated at 13,transferred to [facility] for urgent cardiac cath-revealed multivessel CAD opted for PCUI had ANTONIA-LAD scheduled for staged PCI-LCx remains chest pain free no overnight events noted,Right Radial site no hematoma pulses intact      PMH -reviewed admission note, no change since admission  Heart failure: Acute [ ] Chronic [ ] Acute on chronic [ ] Diastolic [ ] Systolic [ ] Combined systolic and diastolic[ ]  DAYAN[ ]  ATN[ ]  CKD I [ ] CKDII [ ] CKD III [ ] CKD IV [ ] CKD V [ ] ESRD[ ]    MEDICATIONS  (STANDING):  aspirin enteric coated 81 milliGRAM(s) Oral daily  atorvastatin 80 milliGRAM(s) Oral at bedtime  insulin lispro (HumaLOG) corrective regimen sliding scale   SubCutaneous three times a day before meals  insulin lispro (HumaLOG) corrective regimen sliding scale   SubCutaneous at bedtime  metoprolol tartrate 25 milliGRAM(s) Oral two times a day  sodium chloride 0.9% lock flush 3 milliLiter(s) IV Push every 8 hours  sodium chloride 0.9%. 1000 milliLiter(s) (75 mL/Hr) IV Continuous <Continuous>  ticagrelor 90 milliGRAM(s) Oral every 12 hours    MEDICATIONS  (PRN):  dextrose 40% Gel 15 Gram(s) Oral once PRN Blood Glucose LESS THAN 70 milliGRAM(s)/deciliter  glucagon  Injectable 1 milliGRAM(s) IntraMuscular once PRN Glucose LESS THAN 70 milligrams/deciliter              REVIEW OF SYSTEMS:  Constitutional: [ ] fever, [ ]weight loss,  [x ]fatigue  Eyes: [ ] visual changes  Respiratory: [ ]shortness of breath;  [ ] cough, [ ]wheezing, [ ]chills, [ ]hemoptysis  Cardiovascular: [ ] chest pain, [ ]palpitations, [ ]dizziness,  [ ]leg swelling[ ]orthopnea[ ]PND  Gastrointestinal: [ ] abdominal pain, [ ]nausea, [ ]vomiting,  [ ]diarrhea   Genitourinary: [ ] dysuria, [ ] hematuria  Neurologic: [ ] headaches [ ] tremors[ ]weakness  Skin: [ ] itching, [ ]burning, [ ] rashes  Endocrine: [ ] heat or cold intolerance  Musculoskeletal: [ ] joint pain or swelling; [ ] muscle, back, or extremity pain  Psychiatric: [ ] depression, [ ]anxiety, [ ]mood swings, or [ ]difficulty sleeping  Hematologic: [ ] easy bruising, [ ] bleeding gums    [x] All remaining systems negative except as per above.   [ ]Unable to obtain.    Vital Signs Last 24 Hrs  T(C): 36.8 (20 Oct 2020 05:32), Max: 36.8 (20 Oct 2020 05:32)  T(F): 98.2 (20 Oct 2020 05:32), Max: 98.2 (20 Oct 2020 05:32)  HR: 88 (20 Oct 2020 05:32) (87 - 96)  BP: 125/75 (20 Oct 2020 05:32) (125/75 - 158/102)  RR: 18 (20 Oct 2020 05:32) (16 - 18)  SpO2: 99% (20 Oct 2020 05:32) (97% - 99%)        PHYSICAL EXAM:  General: No acute distress BMI-34.6  HEENT: EOMI, PERRL  Neck: Supple, [ ] JVD  Lungs: Equal air entry bilaterally; [ ] rales [ ] wheezing [ ] rhonchi  Heart: Regular rate and rhythm; [x ] murmur  2 /6 [x ] systolic [ ] diastolic [ ] radiation[ ] rubs [ ]  gallops  Abdomen: Nontender, bowel sounds present  Extremities: No clubbing, cyanosis, [ ] edema  Nervous system:  Alert & Oriented X3, no focal deficits  Psychiatric: Normal affect  Skin: No rashes or lesions    LABS:  10-19    133<L>  |  99  |  19<H>  ----------------------------<  337<H>  4.0   |  26  |  1.16    Ca    9.3      19 Oct 2020 13:43    TPro  8.4<H>  /  Alb  4.0  /  TBili  1.0  /  DBili  x   /  AST  91<H>  /  ALT  50  /  AlkPhos  104  10-19    Creatinine Trend: 1.16<--                        16.7   12.21 )-----------( 237      ( 19 Oct 2020 13:43 )             47.8     PT/INR - ( 19 Oct 2020 13:43 )   PT: 10.6 sec;   INR: 0.89 ratio         PTT - ( 19 Oct 2020 13:43 )  PTT:29.2 sec    Cardiac Enzymes: CARDIAC MARKERS ( 19 Oct 2020 13:43 )  13.500 ng/mL / x     / 824 U/L / x     / x     `    TELEMETRY:Sinus rhythm no arrhythmias      IMPRESSION AND PLAN:    Problem/Recommendation - 1:  Problem: NSTEMI (non-ST elevated myocardial infarction). Recommendation: Chest pain ACS-NSTEMI  Elevated troponins with ECG changes and active angina  Cardiac cath-Multivessel CAD s/p PCI-ANTONIA LAD for staged PCI-LCx/RCA  Continue DAPT  TTE LV Systolic function.    Problem/Recommendation - 2:  ·  Problem: Hypertensive urgency.  Recommendation: No prior history HTN  Likely in setting of acute MI  BP better continue Metoprolol  Add losartan`.     Problem/Recommendation - 3:  ·  Problem: Type 2 diabetes mellitus with hyperglycemia, without long-term current use of insulin.  Recommendation: Family Hx DM  New onset T2DM  A1c  -POCT Blood Glucose.: 259 <--202 <--222 mg/dL DATE OF SERVICE:10/20/2020  Patient was seen and examined by me,interim events noted.    PRESENTING CC:Chest pain -NSTEMI    HPI and HOSPITAL COURSE: :49 y/o M with no known PMHx, has not seen his doctor in a few years, presents to [facility] ED with several days of chest pain.ECG revealed ST depressions refers to anterior chest pressure,initial Troponins elevated at 13,transferred to [facility] for urgent cardiac cath-revealed multivessel CAD opted for PCUI had ANTONIA-LCx scheduled for staged PCI-LAD remains chest pain free no overnight events noted,Right Radial site no hematoma pulses intact      PMH -reviewed admission note, no change since admission  Heart failure: Acute [ ] Chronic [ ] Acute on chronic [ ] Diastolic [ ] Systolic [ ] Combined systolic and diastolic[ ]  DAYAN[ ]  ATN[ ]  CKD I [ ] CKDII [ ] CKD III [ ] CKD IV [ ] CKD V [ ] ESRD[ ]    MEDICATIONS  (STANDING):  aspirin enteric coated 81 milliGRAM(s) Oral daily  atorvastatin 80 milliGRAM(s) Oral at bedtime  insulin lispro (HumaLOG) corrective regimen sliding scale   SubCutaneous three times a day before meals  insulin lispro (HumaLOG) corrective regimen sliding scale   SubCutaneous at bedtime  metoprolol tartrate 25 milliGRAM(s) Oral two times a day  sodium chloride 0.9% lock flush 3 milliLiter(s) IV Push every 8 hours  sodium chloride 0.9%. 1000 milliLiter(s) (75 mL/Hr) IV Continuous <Continuous>  ticagrelor 90 milliGRAM(s) Oral every 12 hours    MEDICATIONS  (PRN):  dextrose 40% Gel 15 Gram(s) Oral once PRN Blood Glucose LESS THAN 70 milliGRAM(s)/deciliter  glucagon  Injectable 1 milliGRAM(s) IntraMuscular once PRN Glucose LESS THAN 70 milligrams/deciliter              REVIEW OF SYSTEMS:  Constitutional: [ ] fever, [ ]weight loss,  [x ]fatigue  Eyes: [ ] visual changes  Respiratory: [ ]shortness of breath;  [ ] cough, [ ]wheezing, [ ]chills, [ ]hemoptysis  Cardiovascular: [ ] chest pain, [ ]palpitations, [ ]dizziness,  [ ]leg swelling[ ]orthopnea[ ]PND  Gastrointestinal: [ ] abdominal pain, [ ]nausea, [ ]vomiting,  [ ]diarrhea   Genitourinary: [ ] dysuria, [ ] hematuria  Neurologic: [ ] headaches [ ] tremors[ ]weakness  Skin: [ ] itching, [ ]burning, [ ] rashes  Endocrine: [ ] heat or cold intolerance  Musculoskeletal: [ ] joint pain or swelling; [ ] muscle, back, or extremity pain  Psychiatric: [ ] depression, [ ]anxiety, [ ]mood swings, or [ ]difficulty sleeping  Hematologic: [ ] easy bruising, [ ] bleeding gums    [x] All remaining systems negative except as per above.   [ ]Unable to obtain.    Vital Signs Last 24 Hrs  T(C): 36.8 (20 Oct 2020 05:32), Max: 36.8 (20 Oct 2020 05:32)  T(F): 98.2 (20 Oct 2020 05:32), Max: 98.2 (20 Oct 2020 05:32)  HR: 88 (20 Oct 2020 05:32) (87 - 96)  BP: 125/75 (20 Oct 2020 05:32) (125/75 - 158/102)  RR: 18 (20 Oct 2020 05:32) (16 - 18)  SpO2: 99% (20 Oct 2020 05:32) (97% - 99%)        PHYSICAL EXAM:  General: No acute distress BMI-34.6  HEENT: EOMI, PERRL  Neck: Supple, [ ] JVD  Lungs: Equal air entry bilaterally; [ ] rales [ ] wheezing [ ] rhonchi  Heart: Regular rate and rhythm; [x ] murmur  2 /6 [x ] systolic [ ] diastolic [ ] radiation[ ] rubs [ ]  gallops  Abdomen: Nontender, bowel sounds present  Extremities: No clubbing, cyanosis, [ ] edema  Nervous system:  Alert & Oriented X3, no focal deficits  Psychiatric: Normal affect  Skin: No rashes or lesions    LABS:  10-19    133<L>  |  99  |  19<H>  ----------------------------<  337<H>  4.0   |  26  |  1.16    Ca    9.3      19 Oct 2020 13:43    TPro  8.4<H>  /  Alb  4.0  /  TBili  1.0  /  DBili  x   /  AST  91<H>  /  ALT  50  /  AlkPhos  104  10-19    Creatinine Trend: 1.16<--                        16.7   12.21 )-----------( 237      ( 19 Oct 2020 13:43 )             47.8     PT/INR - ( 19 Oct 2020 13:43 )   PT: 10.6 sec;   INR: 0.89 ratio         PTT - ( 19 Oct 2020 13:43 )  PTT:29.2 sec    Cardiac Enzymes: CARDIAC MARKERS ( 19 Oct 2020 13:43 )  13.500 ng/mL / x     / 824 U/L / x     / x     `    TELEMETRY:Sinus rhythm no arrhythmias      IMPRESSION AND PLAN:    Problem/Recommendation - 1:  Problem: NSTEMI (non-ST elevated myocardial infarction). Recommendation: Chest pain ACS-NSTEMI  Elevated troponins with ECG changes and active angina  Cardiac cath-Multivessel CAD s/p PCI-ANTONIA LCx for staged PCI-LAD/RCA  Continue DAPT  TTE LV Systolic function.    Problem/Recommendation - 2:  ·  Problem: Hypertensive urgency.  Recommendation: No prior history HTN  Likely in setting of acute MI  BP better continue Metoprolol  Add losartan`.     Problem/Recommendation - 3:  ·  Problem: Type 2 diabetes mellitus with hyperglycemia, without long-term current use of insulin.  Recommendation: Family Hx DM  New onset T2DM  A1c  -POCT Blood Glucose.: 259 <--202 <--222 mg/dL DATE OF SERVICE:10/20/2020  Patient was seen and examined by me,interim events noted.    PRESENTING CC:Chest pain -NSTEMI    HPI and HOSPITAL COURSE: :51 y/o M with no known PMHx, has not seen his doctor in a few years, presents to [facility] ED with several days of chest pain.ECG revealed ST depressions refers to anterior chest pressure,initial Troponins elevated at 13,transferred to [facility] for urgent cardiac cath-revealed multivessel CAD opted for PCUI had ANTONIA-LCx scheduled for staged PCI-LAD remains chest pain free no overnight events noted,Right Radial site no hematoma pulses intact      PMH -reviewed admission note, no change since admission  Heart failure: Acute [ ] Chronic [ ] Acute on chronic [ ] Diastolic [ ] Systolic [ ] Combined systolic and diastolic[ ]  DAYAN[ ]  ATN[ ]  CKD I [ ] CKDII [ ] CKD III [ ] CKD IV [ ] CKD V [ ] ESRD[ ]    MEDICATIONS  (STANDING):  aspirin enteric coated 81 milliGRAM(s) Oral daily  atorvastatin 80 milliGRAM(s) Oral at bedtime  insulin lispro (HumaLOG) corrective regimen sliding scale   SubCutaneous three times a day before meals  insulin lispro (HumaLOG) corrective regimen sliding scale   SubCutaneous at bedtime  metoprolol tartrate 25 milliGRAM(s) Oral two times a day  sodium chloride 0.9% lock flush 3 milliLiter(s) IV Push every 8 hours  sodium chloride 0.9%. 1000 milliLiter(s) (75 mL/Hr) IV Continuous <Continuous>  ticagrelor 90 milliGRAM(s) Oral every 12 hours    MEDICATIONS  (PRN):  dextrose 40% Gel 15 Gram(s) Oral once PRN Blood Glucose LESS THAN 70 milliGRAM(s)/deciliter  glucagon  Injectable 1 milliGRAM(s) IntraMuscular once PRN Glucose LESS THAN 70 milligrams/deciliter              REVIEW OF SYSTEMS:  Constitutional: [ ] fever, [ ]weight loss,  [x ]fatigue  Eyes: [ ] visual changes  Respiratory: [ ]shortness of breath;  [ ] cough, [ ]wheezing, [ ]chills, [ ]hemoptysis  Cardiovascular: [ ] chest pain, [ ]palpitations, [ ]dizziness,  [ ]leg swelling[ ]orthopnea[ ]PND  Gastrointestinal: [ ] abdominal pain, [ ]nausea, [ ]vomiting,  [ ]diarrhea   Genitourinary: [ ] dysuria, [ ] hematuria  Neurologic: [ ] headaches [ ] tremors[ ]weakness  Skin: [ ] itching, [ ]burning, [ ] rashes  Endocrine: [ ] heat or cold intolerance  Musculoskeletal: [ ] joint pain or swelling; [ ] muscle, back, or extremity pain  Psychiatric: [ ] depression, [ ]anxiety, [ ]mood swings, or [ ]difficulty sleeping  Hematologic: [ ] easy bruising, [ ] bleeding gums    [x] All remaining systems negative except as per above.   [ ]Unable to obtain.    Vital Signs Last 24 Hrs  T(C): 36.8 (20 Oct 2020 05:32), Max: 36.8 (20 Oct 2020 05:32)  T(F): 98.2 (20 Oct 2020 05:32), Max: 98.2 (20 Oct 2020 05:32)  HR: 88 (20 Oct 2020 05:32) (87 - 96)  BP: 125/75 (20 Oct 2020 05:32) (125/75 - 158/102)  RR: 18 (20 Oct 2020 05:32) (16 - 18)  SpO2: 99% (20 Oct 2020 05:32) (97% - 99%)        PHYSICAL EXAM:  General: No acute distress BMI-34.6  HEENT: EOMI, PERRL  Neck: Supple, [ ] JVD  Lungs: Equal air entry bilaterally; [ ] rales [ ] wheezing [ ] rhonchi  Heart: Regular rate and rhythm; [x ] murmur  2 /6 [x ] systolic [ ] diastolic [ ] radiation[ ] rubs [ ]  gallops  Abdomen: Nontender, bowel sounds present  Extremities: No clubbing, cyanosis, [ ] edema  Nervous system:  Alert & Oriented X3, no focal deficits  Psychiatric: Normal affect  Skin: No rashes or lesions    LABS:  10-19    133<L>  |  99  |  19<H>  ----------------------------<  337<H>  4.0   |  26  |  1.16    Ca    9.3      19 Oct 2020 13:43    TPro  8.4<H>  /  Alb  4.0  /  TBili  1.0  /  DBili  x   /  AST  91<H>  /  ALT  50  /  AlkPhos  104  10-19    Creatinine Trend: 1.16<--                        16.7   12.21 )-----------( 237      ( 19 Oct 2020 13:43 )             47.8     PT/INR - ( 19 Oct 2020 13:43 )   PT: 10.6 sec;   INR: 0.89 ratio         PTT - ( 19 Oct 2020 13:43 )  PTT:29.2 sec    Cardiac Enzymes: CARDIAC MARKERS ( 19 Oct 2020 13:43 )  13.500 ng/mL / x     / 824 U/L / x     / x     `    TELEMETRY:Sinus rhythm no arrhythmias      IMPRESSION AND PLAN:    Problem/Recommendation - 1:  Problem: NSTEMI (non-ST elevated myocardial infarction). Recommendation: Chest pain ACS-NSTEMI  Elevated troponins with ECG changes and active angina  Cardiac cath-Multivessel CAD s/p PCI-ANTONIA LCx for staged PCI-LAD/RCA  Continue DAPT  TTE LV Systolic function.    Problem/Recommendation - 2:  ·  Problem: Hypertensive urgency.  Recommendation: No prior history HTN  Likely in setting of acute MI  BP better continue Metoprolol  Add losartan`.     Problem/Recommendation - 3:  ·  Problem: Type 2 diabetes mellitus with hyperglycemia, without long-term current use of insulin.  Recommendation: Family Hx DM  New onset T2DM  A1c  -POCT Blood Glucose.: 259 <--202 <--222 mg/dL       Discussed in detail with patient concerning Cath findings and new onset DM,he is an  and wishes to resume his profession,explained need for adherence to Medications regular medical follow up and dietary restrictions.Time spent >65 Mins  Promuc CODE # [id number]

## 2020-10-20 NOTE — CONSULT NOTE ADULT - ATTENDING COMMENTS
Uncontrolled DM2 newly diagnosed, had not seen doctor recently. HbA1c 11.2% in setting of STEMI.  Would dc on basal insulin (new to insulin, needs pen teaching) as well as metformin and possible GLP1 based on requirements.  Inpatient basal bolus plan.  Anion gap elevated repeat BMP with BHB, lactate.  Endocrine team consulted for uncontrolled diabetes. Patient is high risk with high level decision making due to uncontrolled diabetes which places patient at high risk for cardiovascular and cerebrovascular events. Patient with lability of glucose requiring close monitoring and insulin adjustments.    Skye Vernon MD  Division of Endocrinology  Pager: [phone]    If after 6PM or before 9AM, or on weekends/holidays, please call endocrine answering service for assistance ([phone]).  For nonurgent matters email [email or web address] for assistance.

## 2020-10-20 NOTE — CONSULT NOTE ADULT - SUBJECTIVE AND OBJECTIVE BOX
49 y/o male, former tobacco abuse, with no significant PMHx, not currently taking any medications, presented to [facility] with chest pain. Pt initially developed a mild, non-pleuritic, non-exertional, non-radiating, left and right sided chest pain 4 days ago on Thursday, October 15. Pt cannot recall what provoked his chest pain, although he did a lot of strenuous physical activity at his job last week. Pt reports that his chest pain worsened slightly the next day, which caused him to go to urgent care. At that time, his EKG was normal and was referred to a cardiologist and prescribed Flexeril, as it was initially thought that his chest pain was muscular. Throughout the weekend, his chest pain worsened to a maximum intensity of 6/10. Pt still cannot recall if anything specifically provoked his chest pain as he denies any exertional or positional component. Pt went back to urgent care this morning and his EKG reportedly had new changes, which is why he was referred to [facility] for evaluation. Pt has never had a stress test or angiogram in the past. Pt does not have any chest pain at this time. Pt denies fever, chills, recent travel, headache, dizziness, visual deficits, shortness of breath, orthopnea, palpitations, abdominal pain, N/V/D/C, hematochezia, melena, dysuria, hematuria, LOC, syncope, peripheral edema. Upon arrival to [facility], EKG revealed NSR with ST depressions in the anterior leads with a troponin I of 13.5 and . Pt was given ASA and transferred to [facility] for cardiac catheterization.   Past Medical History: Tobacco abuse.  Past Surgical History: S/P arthroscopy of right knee   S/P correction of deviated nasal septum.   Social History: · Marital Status	 · Occupation	Works as an  at [employer] · Lives With	spouse · Notes	Independent at baseline  Substance Use History: · Substance Use	never used  Alcohol Use History: · Have you ever consumed alcohol	never  Tobacco Usage: · Tobacco Usage: Former smoker · Tobacco Type: cigarettes  Quit 4 years ago · Number of Packs per Day: 1.5 · Number of yrs: 20 · Pack yrs: 30  Passive Smoke Exposure: · Passive Smoke Exposure	No  Family History: Father Still living? No Family history of MI (myocardial infarction), Age at diagnosis: Age Unknown.  Home Medications:  * Outpatient Medication Status not yet specified Review of Systems:  Respiratory / Cardiology / Neurology: · Negative Respiratory and Thorax Symptoms	no wheezing; no dyspnea; no cough; no hemoptysis; no pleuritic chest pain · Negative Cardiovascular Symptoms	no palpitations; no dyspnea on exertion; no orthopnea; no paroxysmal nocturnal dyspnea; no peripheral edema; no claudication · Cardiovascular Symptoms	chest pain; See HPI · Neurological	negative  Vital Signs/Physical Exam:  Height/Weight: · Weight Type/Method	stated · 	 used · Weight (kg)	113.4 kg · Weight  (lbs)	250 Pound(s) · Height Type	stated · Height in feet	5 Feet · Height (in)	10 Inch(s) · Height (cm)	177.8 Centimeter(s) · BMI (kg/m2)	35.87 · BSA (m2)	2.29 Meter Squared  Physical Exam: · Constitutional	detailed exam · Constitutional Details	well-developed; well-groomed; well-nourished; no distress · Neck	detailed exam · Neck Details	supple; no JVD · Respiratory	detailed exam · Respiratory Details	airway patent; breath sounds equal; good air movement; respirations non-labored; clear to auscultation bilaterally; no chest wall tenderness; no intercostal retractions; no rales; no rhonchi; no wheezes · Cardiovascular	detailed exam · Cardiovascular Details	regular rate and rhythm  no rub  no murmur · Cardiovascular Details	positive S1; positive S2 · Gastrointestinal	detailed exam · GI Normal	soft; nontender; no distention; no masses palpable; bowel sounds normal · Extremities	detailed exam · Extremities Details	no clubbing; no cyanosis; no pedal edema · Vascular	detailed exam · Carotid Pulse	right normal; left normal · Radial Pulse	right normal; left normal · DP Pulse	right normal; left normal · PT Pulse	right normal; left normal · Neurological	detailed exam · Neurological Details	alert and oriented x 3; responds to pain; responds to verbal commands; sensation intact; cranial nerves intact; normal strength · Psychiatric	detailed exam · Psychiatric Details	normal affect; normal behavior  Results:  Laboratory:  General Chemistry:   19-Oct-2020 13:43, Comprehensive Metabolic Panel Sodium, Serum:    133, [135 - 145 mmol/L] Potassium, Serum: 4.0, [3.5 - 5.3 mmol/L] Chloride, Serum: 99, [96 - 108 mmol/L] Carbon Dioxide, Serum: 26, [22 - 31 mmol/L] Anion Gap, Serum: 8, [5 - 17 mmol/L] Blood Urea Nitrogen, Serum:    19, [7 - 18 mg/dL] Creatinine, Serum: 1.16, [0.50 - 1.30 mg/dL] Glucose, Serum:    337, [70 - 99 mg/dL] Calcium, Total Serum: 9.3, [8.4 - 10.5 mg/dL] Protein Total, Serum:    8.4, [6.0 - 8.3 g/dL] Albumin, Serum: 4.0, [3.5 - 5.0 g/dL] Bilirubin Total, Serum: 1.0, [0.2 - 1.2 mg/dL] Alkaline Phosphatase, Serum: 104, [40 - 120 U/L] Aspartate Aminotransferase (AST/SGOT):    91, [10 - 40 U/L] Alanine Aminotransferase (ALT/SGPT): 50, [10 - 60 U/L DA] eGFR if Non African American: 74, [>=60 mL/min/1.73M2], Interpretative comment 	The units for eGFR are mL/min/1.73M2 (normalized body surface area). The 	eGFR is calculated from a serum creatinine using the CKD-EPI equation. 	Other variables required for calculation are race, age and sex. Among 	patients with chronic kidney disease (CKD), the eGFR is useful in 	determining the stage of disease according to KDOQI CKD classification. 	All eGFR results are reported numerically with the following 	interpretation. 	        GFR                    With                 Without 	   (ml/min/1.73 m2)    Kidney Damage       Kidney Damage 	      >= 90                    Stage 1                     Normal 	      60-89                    Stage 2                     Decreased GFR 	      30-59     Stage 3                     Stage 3 	      15-29                    Stage 4                     Stage 4 	      < 15                      Stage 5                     Stage 5 	Each stage of CKD assumes that the associated GFR level has been in 	effect for at least 3 months. Determination of stages one and two (with 	eGFR > 59 ml/min/m2) requires estimation of kidney damage for at least 3 	months as defined by structural or functional abnormalities. 	Limitations: All estimates of GFR will be less accurate for patients at 	extremes of muscle mass (including but not limited to frail elderly, 	critically ill, or cancer patients), those with unusual diets, and those 	with conditions associated with reduced secretion or extrarenal 	elimination of creatinine. The eGFR equation is not recommended for use 	in patients with unstable creatinine levels. eGFR if : 85, [>=60 mL/min/1.73M2]   19-Oct-2020 13:43, Creatine Kinase, Serum Creatine Kinase, Serum:    824, [35 - 232 U/L]   19-Oct-2020 13:43, Troponin I, Serum Troponin I, Serum:    13.500, [0.000 - 0.045 ng/mL], TYPE:(C=Critical, N=Notification, A=Abnormal) N 	TESTS: _CTNI 	DATE/TIME CALLED: 10/19/20 14:19 	CALLED TO: _DR. HONORIO CROSS 	READ BACK (2 Patient Identifiers)(Y/N): _Y 	READ BACK VALUES (Y/N): _Y 	CALLED BY: RUPALI 10/19/20 14:20 	The new reference range for Troponin-I performed on the Siemens Vista 	system is 0.015-0.045 ng/mL, which includes the 99th percentile of a 	healthy reference population. Studies have shown that elevated troponin 	levels above the 99th percentile cutoff are associated with an increased 	risk for adverse cardiac events, with the risk increasing as troponin 	levels increase. As per a joint committee of the American College of 	Cardiology and European Society of Cardiology, diagnosis of classic MI is 	based upon the detection of a rise or fall of cardiac troponin values, 	with at least one value above the 99th percentile upper reference limit, 	in the appropriate clinical context. 	Troponin-I (ng/mL) Interpretation 	0.00-0.045 Normal range (includes the 99th percentile of a healthy 	reference population) 	>0.045 Elevated troponin level indicating increased risk 	Note: Troponin-I and Troponin-T cannot be used interchangeably in serial 	measurements. Minimally elevated Troponin results should be interpreted 	in the context of clinical findings and risk factors. Coagulation:   19-Oct-2020 13:43, Activated Partial Thromboplastin Time Activated Partial Thromboplastin Time: 29.2, [27.5 - 35.5 sec]   19-Oct-2020 13:43, Prothrombin Time and INR, Plasma Prothrombin Time, Plasma: 10.6, [10.6 - 13.6 sec] INR: 0.89, [0.88 - 1.16 ratio], Recommended ranges for therapeutic INR: 	  2.0-3.0 for most medical and surgical thromboembolic states 	  2.0-3.0 for atrial fibrillation 	  2.0-3.0 for bileaflet mechanical valve in aortic position 	  2.5-3.5 for mechanical heart valves 	  Chest 2004;126:[id number] 	The presence of direct thrombin inhibitors (argatroban, refludan) 	may falsely increase results. Hematology:   19-Oct-2020 13:43, Complete Blood Count + Automated Diff WBC Count:    12.21, [3.80 - 10.50 K/uL] RBC Count: 5.73, [4.20 - 5.80 M/uL] Hemoglobin: 16.7, [13.0 - 17.0 g/dL] Hematocrit: 47.8, [39.0 - 50.0 %] Mean Cell Volume: 83.4, [80.0 - 100.0 fl] Mean Cell Hemoglobin: 29.1, [27.0 - 34.0 pg] Mean Cell Hemoglobin Conc: 34.9, [32.0 - 36.0 gm/dL] Red Cell Distrib Width: 13.0, [10.3 - 14.5 %] Platelet Count - Automated: 237, [150 - 400 K/uL] Auto Neutrophil #:    8.79, [1.80 - 7.40 K/uL] Auto Lymphocyte #: 2.52, [1.00 - 3.30 K/uL] Auto Monocyte #: 0.79, [0.00 - 0.90 K/uL] Auto Eosinophil #: 0.06, [0.00 - 0.50 K/uL] Auto Basophil #: 0.03, [0.00 - 0.20 K/uL] Auto Neutrophil %: 72.0, [43.0 - 77.0 %], Differential percentages must be correlated with absolute numbers for 	clinical significance. Auto Lymphocyte %: 20.6, [13.0 - 44.0 %] Auto Monocyte %: 6.5, [2.0 - 14.0 %] Auto Eosinophil %: 0.5, [0.0 - 6.0 %] Auto Basophil %: 0.2, [0.0 - 2.0 %] Auto Immature Granulocyte %: 0.2, [0.0 - 1.5 %] Nucleated RBC: 0, [0 - 0 /100 WBCs]  Comments: · EKG and Interpretation	Sinus tachycardia, 2mm ST depressions V1-V4, LAD   Assessment /  Plan: Patient referred for Cardiac catherization with possible intervention. Informed consent obtained, risks and benefits reviewed and questions answered.. 1. NSTEMI - Plan for cardiac cath today (pt consented, risks/benefits explained, questions answered). Continue ASA and statin therapy. Will start DAPT if stent placed. Likely eventual beta blocker and ACE. 2. Hypertensive urgency - Start Metoprolol 25mg PO BID. Uptitrate as needed. Eventual ACE. Monitor BP serially. Low sodium diet. 3. Diabetes mellitus - Likely undiagnosed DM. Check A1C. Start insulin sliding scale and monitor finger sticks. May require endo consult.

## 2020-10-20 NOTE — DISCHARGE NOTE NURSING/CASE MANAGEMENT/SOCIAL WORK - PATIENT PORTAL LINK FT
You can access the FollowMyHealth Patient Portal offered by [facility] by registering at the following website: [email or web address]. By joining [employer]’s FollowMyHealth portal, you will also be able to view your health information using other applications (apps) compatible with our system.

## 2020-10-20 NOTE — CONSULT NOTE ADULT - ASSESSMENT
nstemi- s/p cath - Cardiac cath-Multivessel CAD s/p PCI-ANTONIA LAD for staged PCI-LCx/RCA    dm2- new onset , endo eval , diabetic teaching     Htn - uncontrolled, titrate htn meds for optimal bp control    Hld- cont statin

## 2020-10-20 NOTE — CONSULT NOTE ADULT - PROBLEM SELECTOR RECOMMENDATION 9
Hgb a1c of 11.2   -start Lantus 22 units qhs   -start Admelog 7 units tidac   -Admelog low dose sc correction scale TIDAC.   -Admelog low dose sc correction scale qhs   -Nutrition consult  -provider to RN insulin pen teaching    Discharge  Basal insulin + Metformin 1g BID. May add GLP-1 as well if meal time insulin requirement is high.   [facility]  [address] [place] [address], [address], [place] [address]  [phone] Hgb a1c of 11.2   -start Lantus 22 units qhs   -start Admelog 7 units tidac   -Admelog moderate dose sc correction scale TIDAC.   -Admelog moderate dose sc correction scale qhs   -Nutrition consult  -provider to RN insulin pen teaching    Discharge  Basal insulin + Metformin 1g BID. May add GLP-1 as well if meal time insulin requirement is high.   [facility]  [address] [place] [address], [address], [place] [address]  [phone]

## 2020-10-20 NOTE — CHART NOTE - NSCHARTNOTEFT_GEN_A_CORE
S: No events overnight with no symptoms (chest pain, dyspnea, jaw pain, etc).    Tele: No events overnight, SR    O:  -Vitals: VSS and wnl  -Gen: NAD, resting in bed  -HEENT: NCAT, EOMI  -CV: RRR  -PULM: no distress  -Extr: R wrist C/D/I    Labs:  -Downtrending leukocytosis, uncontrolled BG, mild TBili elevation, uncontrolled lipids (particularly TG)    A/P:    51 y/o M s/p ANTONIA to Cx with plan for staged intervention to LAD today.    -Continue GDMT (Hb<7, DAPT, BP<130/80, LDL<70, TG<150) and management per inpatient/cardiology consult team.  -Staged PCI today.    Lamin Taylor MD SARIKA  PGY7 Cardiology  Pager [phone]

## 2020-10-21 ENCOUNTER — TRANSCRIPTION ENCOUNTER (OUTPATIENT)
Age: 50
End: 2020-10-21

## 2020-10-21 VITALS
RESPIRATION RATE: 18 BRPM | HEART RATE: 93 BPM | DIASTOLIC BLOOD PRESSURE: 85 MMHG | TEMPERATURE: 99 F | OXYGEN SATURATION: 100 % | SYSTOLIC BLOOD PRESSURE: 139 MMHG

## 2020-10-21 PROBLEM — Z00.00 ENCOUNTER FOR PREVENTIVE HEALTH EXAMINATION: Status: ACTIVE | Noted: 2020-10-21

## 2020-10-21 PROBLEM — Z72.0 TOBACCO USE: Chronic | Status: ACTIVE | Noted: 2020-10-19

## 2020-10-21 LAB
ANION GAP SERPL CALC-SCNC: 15 MMO/L — HIGH (ref 7–14)
BUN SERPL-MCNC: 20 MG/DL — SIGNIFICANT CHANGE UP (ref 7–23)
CALCIUM SERPL-MCNC: 9 MG/DL — SIGNIFICANT CHANGE UP (ref 8.4–10.5)
CHLORIDE SERPL-SCNC: 98 MMOL/L — SIGNIFICANT CHANGE UP (ref 98–107)
CO2 SERPL-SCNC: 18 MMOL/L — LOW (ref 22–31)
CREAT SERPL-MCNC: 0.83 MG/DL — SIGNIFICANT CHANGE UP (ref 0.5–1.3)
GLUCOSE BLDC GLUCOMTR-MCNC: 221 MG/DL — HIGH (ref 70–99)
GLUCOSE BLDC GLUCOMTR-MCNC: 241 MG/DL — HIGH (ref 70–99)
GLUCOSE BLDC GLUCOMTR-MCNC: 264 MG/DL — HIGH (ref 70–99)
GLUCOSE SERPL-MCNC: 222 MG/DL — HIGH (ref 70–99)
HCT VFR BLD CALC: 47.3 % — SIGNIFICANT CHANGE UP (ref 39–50)
HGB BLD-MCNC: 15.7 G/DL — SIGNIFICANT CHANGE UP (ref 13–17)
MAGNESIUM SERPL-MCNC: 2.1 MG/DL — SIGNIFICANT CHANGE UP (ref 1.6–2.6)
MCHC RBC-ENTMCNC: 28 PG — SIGNIFICANT CHANGE UP (ref 27–34)
MCHC RBC-ENTMCNC: 33.2 % — SIGNIFICANT CHANGE UP (ref 32–36)
MCV RBC AUTO: 84.3 FL — SIGNIFICANT CHANGE UP (ref 80–100)
NRBC # FLD: 0 K/UL — SIGNIFICANT CHANGE UP (ref 0–0)
PHOSPHATE SERPL-MCNC: 2.7 MG/DL — SIGNIFICANT CHANGE UP (ref 2.5–4.5)
PLATELET # BLD AUTO: 261 K/UL — SIGNIFICANT CHANGE UP (ref 150–400)
PMV BLD: 8.6 FL — SIGNIFICANT CHANGE UP (ref 7–13)
POTASSIUM SERPL-MCNC: 4.1 MMOL/L — SIGNIFICANT CHANGE UP (ref 3.5–5.3)
POTASSIUM SERPL-SCNC: 4.1 MMOL/L — SIGNIFICANT CHANGE UP (ref 3.5–5.3)
RBC # BLD: 5.61 M/UL — SIGNIFICANT CHANGE UP (ref 4.2–5.8)
RBC # FLD: 13.1 % — SIGNIFICANT CHANGE UP (ref 10.3–14.5)
SODIUM SERPL-SCNC: 131 MMOL/L — LOW (ref 135–145)
WBC # BLD: 10.57 K/UL — HIGH (ref 3.8–10.5)
WBC # FLD AUTO: 10.57 K/UL — HIGH (ref 3.8–10.5)

## 2020-10-21 PROCEDURE — 99232 SBSQ HOSP IP/OBS MODERATE 35: CPT

## 2020-10-21 RX ORDER — TICAGRELOR 90 MG/1
1 TABLET ORAL
Qty: 60 | Refills: 0
Start: 2020-10-21 | End: 2020-11-19

## 2020-10-21 RX ORDER — INSULIN GLARGINE 100 [IU]/ML
25 INJECTION, SOLUTION SUBCUTANEOUS
Qty: 10 | Refills: 0
Start: 2020-10-21 | End: 2020-11-19

## 2020-10-21 RX ORDER — METOPROLOL TARTRATE 50 MG
1 TABLET ORAL
Qty: 60 | Refills: 0
Start: 2020-10-21 | End: 2020-11-19

## 2020-10-21 RX ORDER — ISOPROPYL ALCOHOL, BENZOCAINE .7; .06 ML/ML; ML/ML
1 SWAB TOPICAL
Qty: 100 | Refills: 1
Start: 2020-10-21 | End: 2020-12-09

## 2020-10-21 RX ORDER — INSULIN GLARGINE 100 [IU]/ML
25 INJECTION, SOLUTION SUBCUTANEOUS AT BEDTIME
Refills: 0 | Status: DISCONTINUED | OUTPATIENT
Start: 2020-10-21 | End: 2020-10-21

## 2020-10-21 RX ORDER — INSULIN LISPRO 100/ML
9 VIAL (ML) SUBCUTANEOUS
Refills: 0 | Status: DISCONTINUED | OUTPATIENT
Start: 2020-10-21 | End: 2020-10-21

## 2020-10-21 RX ORDER — DULAGLUTIDE 4.5 MG/.5ML
0.75 INJECTION, SOLUTION SUBCUTANEOUS
Qty: 3.21 | Refills: 0
Start: 2020-10-21 | End: 2020-11-19

## 2020-10-21 RX ORDER — ASPIRIN/CALCIUM CARB/MAGNESIUM 324 MG
1 TABLET ORAL
Qty: 0 | Refills: 0 | DISCHARGE
Start: 2020-10-21

## 2020-10-21 RX ORDER — TICAGRELOR 90 MG/1
1 TABLET ORAL
Qty: 60 | Refills: 3
Start: 2020-10-21 | End: 2021-02-17

## 2020-10-21 RX ORDER — ATORVASTATIN CALCIUM 80 MG/1
1 TABLET, FILM COATED ORAL
Qty: 30 | Refills: 0
Start: 2020-10-21 | End: 2020-11-19

## 2020-10-21 RX ADMIN — Medication 81 MILLIGRAM(S): at 12:08

## 2020-10-21 RX ADMIN — SODIUM CHLORIDE 3 MILLILITER(S): 9 INJECTION INTRAMUSCULAR; INTRAVENOUS; SUBCUTANEOUS at 06:22

## 2020-10-21 RX ADMIN — Medication 7 UNIT(S): at 08:19

## 2020-10-21 RX ADMIN — Medication 6: at 12:08

## 2020-10-21 RX ADMIN — Medication 25 MILLIGRAM(S): at 17:31

## 2020-10-21 RX ADMIN — SODIUM CHLORIDE 3 MILLILITER(S): 9 INJECTION INTRAMUSCULAR; INTRAVENOUS; SUBCUTANEOUS at 13:40

## 2020-10-21 RX ADMIN — Medication 25 MILLIGRAM(S): at 06:21

## 2020-10-21 RX ADMIN — TICAGRELOR 90 MILLIGRAM(S): 90 TABLET ORAL at 06:21

## 2020-10-21 RX ADMIN — Medication 7 UNIT(S): at 12:08

## 2020-10-21 RX ADMIN — Medication 4: at 08:19

## 2020-10-21 RX ADMIN — Medication 4: at 17:30

## 2020-10-21 RX ADMIN — Medication 9 UNIT(S): at 17:30

## 2020-10-21 RX ADMIN — TICAGRELOR 90 MILLIGRAM(S): 90 TABLET ORAL at 17:31

## 2020-10-21 NOTE — DIETITIAN INITIAL EVALUATION ADULT. - PERTINENT MEDS FT
Ticagrelor  Aspirin  Lantus  ADMELOG  Metaprolol  Atorvastatin Ticagrelor, Aspirin, Lantus, ADMELOG, Metaprolol, Atorvastatin

## 2020-10-21 NOTE — PROGRESS NOTE ADULT - ASSESSMENT
49 y/o male, former tobacco abuse, T2DM, CAD admitted for multivessel disease for staged PCI.   Found to have new onset T2DM. Endocrine consulted for T2DM.     Problem/Recommendation - 1:  Problem: Type 2 diabetes mellitus with hyperglycemia, without long-term current use of insulin. Recommendation: Hgb a1c of 11.2% uncontrolled and in needing of aggressive plan in light of CAD.  While inpatient:  -Increase Lantus to 25 units qhs   -Increase Admelog to 9 units tidac   -Admelog moderate dose sc correction scale TIDAC.   -Admelog moderate dose sc correction scale qhs   -Nutrition consult completed  -provider to RN insulin pen teaching needed before discharge    Discharge plan for today:  Basal insulin pen such as Lantus or Basaglar at 25 units qhs  BD brandon pen needles  Trulicity 0.75 mg SQ weekly (if not covered then Ozempic 0.25mg SQ weekly x 4 weeks then increase to 0.5mg weekly).  And metformin 500mg BID take with food, start 72 hours after cath (Friday).  Reviewed timing of medications, hypoglycemia treatment   Reviewed importance of lifestyle modification as previously diet was high in sweets and carbs.    [facility]  [address] [place] [address], [address], [place] [address]  [phone].  Problem/Recommendation - 2:  ·  Problem: Other hyperlipidemia.  Recommendation: Continue lipitor 80 mg daily  Goal LDL <55 given premature CAD and DM.     Problem/Recommendation - 3:  ·  Problem: HTN (hypertension), benign.  Recommendation: Currently at goal   continue lopressor and Losartan.

## 2020-10-21 NOTE — PROGRESS NOTE ADULT - ATTENDING COMMENTS
Patient was seen and examined by me on 10/20/2020,interim events noted,labs and radiology studies reviewed.  Derik Murrieta MD,FACC.  [address] [place] [address].  [facility].  [phone]
Patient was seen and examined by me on 10/21/2020,interim events noted,labs and radiology studies reviewed.  Derik Murrieta MD,FACC.  [address] [place] [address].  [facility].  [phone]
Skye Vernon MD  Division of Endocrinology  Pager: [phone]    If after 6PM or before 9AM, or on weekends/holidays, please call endocrine answering service for assistance ([phone]).  For nonurgent matters email [email or web address] for assistance.

## 2020-10-21 NOTE — DISCHARGE NOTE PROVIDER - CARE PROVIDER_API CALL
Derik Murrieta)  Cardiology  [address] [id number]  Phone: [phone]  Fax: [phone]  Established Patient  Follow Up Time: 1 week

## 2020-10-21 NOTE — DISCHARGE NOTE PROVIDER - NSDCFUADDAPPT_GEN_ALL_CORE_FT
[facility]  [address] [facility], [address], [place] [address]  [phone].

## 2020-10-21 NOTE — DISCHARGE NOTE PROVIDER - NSDCMRMEDTOKEN_GEN_ALL_CORE_FT
Brilinta (ticagrelor) 90 mg oral tablet: 1 tab(s) orally 2 times a day    alcohol swabs : Apply topically to affected area 4 times a day   aspirin 81 mg oral delayed release tablet: 1 tab(s) orally once a day  atorvastatin 80 mg oral tablet: 1 tab(s) orally once a day (at bedtime)  Basaglar KwikPen 100 units/mL subcutaneous solution: 25 unit(s) subcutaneous once a day   Brilinta (ticagrelor) 90 mg oral tablet: 1 tab(s) orally every 12 hours  glucometer (per patient&#x27;s insurance): Test blood sugars four times a day. Dispense #1 glucometer.  lancets: 1 application subcutaneously 4 times a day   metformin 500 mg oral tablet: 1 tab(s) orally 2 times a day   metoprolol tartrate 25 mg oral tablet: 1 tab(s) orally 2 times a day  test strips (per patient&#x27;s insurance): 1 application subcutaneously 4 times a day. ** Compatible with patient&#x27;s glucometer **  Trulicity Pen 0.75 mg/0.5 mL subcutaneous solution: 0.75 milligram(s) subcutaneously once a week    alcohol swabs : Apply topically to affected area 4 times a day   aspirin 81 mg oral delayed release tablet: 1 tab(s) orally once a day  atorvastatin 80 mg oral tablet: 1 tab(s) orally once a day (at bedtime)  Basaglar KwikPen 100 units/mL subcutaneous solution: 25 unit(s) subcutaneous once a day   Brilinta (ticagrelor) 90 mg oral tablet: 1 tab(s) orally every 12 hours  glucometer (per patient&#x27;s insurance): Test blood sugars four times a day. Dispense #1 glucometer.  Insulin Pen Needles, 4mm: 1 application subcutaneously 4 times a day. ** Use with insulin pen **   lancets: 1 application subcutaneously 4 times a day   metformin 500 mg oral tablet: 1 tab(s) orally 2 times a day   metoprolol tartrate 25 mg oral tablet: 1 tab(s) orally 2 times a day  test strips (per patient&#x27;s insurance): 1 application subcutaneously 4 times a day. ** Compatible with patient&#x27;s glucometer **  Trulicity Pen 0.75 mg/0.5 mL subcutaneous solution: 0.75 milligram(s) subcutaneously once a week

## 2020-10-21 NOTE — DIETITIAN INITIAL EVALUATION ADULT. - ADD RECOMMEND
1. Monitor tolerance to PO diet. 2. Monitor labs, weights, hydration status. 3. 1. Monitor tolerance to PO diet. 2. Monitor labs, weights, hydration status. 3. Recommend to follow up with an outpatient RD for better management of diabetes and CVD.

## 2020-10-21 NOTE — DISCHARGE NOTE PROVIDER - HOSPITAL COURSE
51 y/o M with no known PMHx, has not seen his doctor in a few years, presents to [facility] ED with several days of chest pain.ECG revealed ST depressions refers to anterior chest pressure,initial Troponins elevated at 13,transferred to [facility] for urgent cardiac cath-revealed multivessel CAD opted for PCUI had ANTONIA-LCx scheduled for staged PCI-LAD remains chest pain free no overnight events noted,Right Radial site no hematoma pulses intact  NSTEMI (non-ST elevated myocardial infarction). Recommendation: Chest pain ACS-NSTEMI  Elevated troponins with ECG changes and active angina  Cardiac cath-Multivessel CAD s/p PCI-ANTONIA LCx ,had staged PCI-LAD  -Staged PCI to RCA if patient has refractory angina  Continue DAPT  TTE LV Systolic function-Normal LV systolic Function EF 55%    Hypertensive urgency.  Recommendation: No prior history HTN  Likely in setting of acute MI  BP better continue Metoprolol    Type 2 diabetes mellitus with hyperglycemia, without long-term current use of insulin.  Recommendation: Family Hx DM  New onset T2DM  A1C with Estimated Average Glucose: 11.2 %  -POCT Blood Glucose.: 277 <---178 <---305 mg/dL   -Endocrine        49 y/o M with no known PMHx, has not seen his doctor in a few years, presents to [facility] ED with several days of chest pain.ECG revealed ST depressions refers to anterior chest pressure,initial Troponins elevated at 13,transferred to [facility] for urgent cardiac cath-revealed multivessel CAD opted for PCUI had ANTONIA-LCx scheduled for staged PCI-LAD remains chest pain free no overnight events noted,Right Radial site no hematoma pulses intact  NSTEMI (non-ST elevated myocardial infarction). Recommendation: Chest pain ACS-NSTEMI  Elevated troponins with ECG changes and active angina  Cardiac cath-Multivessel CAD s/p PCI-ANTONIA LCx ,had staged PCI-LAD  -Staged PCI to RCA if patient has refractory angina  Continue DAPT  TTE LV Systolic function-Normal LV systolic Function EF 55%    Hypertensive urgency.  Recommendation: No prior history HTN  Likely in setting of acute MI  BP better continue Metoprolol    Type 2 diabetes mellitus with hyperglycemia, without long-term current use of insulin.  Recommendation: Family Hx DM  New onset T2DM  A1C with Estimated Average Glucose: 11.2 %  -POCT Blood Glucose.: 277 <---178 <---305 mg/dL   -Endocrine   Discharge  Basal insulin + Metformin 1g BID. May add GLP-1 as well if meal time insulin requirement is high.     10/21/2020- As per attending, patient stable for discharge.

## 2020-10-21 NOTE — DIETITIAN INITIAL EVALUATION ADULT. - ETIOLOGY
undesirable food choices endocrine dysfunction, physiological cause; excessive energy intake/unhealthy eating habit excessive energy intake/unhealthy eating habit; endocrine dysfunction, physiological cause

## 2020-10-21 NOTE — DIETITIAN INITIAL EVALUATION ADULT. - PERTINENT LABORATORY DATA
10/20/20  serum glucose 255  serum cholesterol 226  HDL 38    Triglycerides 384  A1c 11.2% (10/20/20) serum glucose 255, serum cholesterol 226, HDL 38, , Triglycerides 384, A1c 11.2%

## 2020-10-21 NOTE — DISCHARGE NOTE PROVIDER - NSFOLLOWUPCLINICS_GEN_ALL_ED_FT
[facility] Endocrinology  Endocrinology  [address] [place] [address]  Phone: [phone]  Fax:   Follow Up Time: 1 week

## 2020-10-21 NOTE — PROGRESS NOTE ADULT - ASSESSMENT
nstemi- s/p cath - Cardiac cath-Multivessel CAD s/p PCI-ANTONIA LAD for staged PCI-LCx/RCA    dm2- new onset , endo evaluated pt  , diabetic teaching     Htn - uncontrolled, titrate htn meds for optimal bp control    Hld- cont statin    had extensive lengthy discussion with pt about pts current dinical status / management plan , all questions answered     time spent >65 min     [facility] health code # [id number]

## 2020-10-21 NOTE — DIETITIAN INITIAL EVALUATION ADULT. - LITERATURE/VIDEOS GIVEN
Heart healthy consistent carbohydrate, MNT for triglycerides, MNT for cholesterol, Sodium free cooking tips

## 2020-10-21 NOTE — DIETITIAN INITIAL EVALUATION ADULT. - ORAL INTAKE PTA/DIET HISTORY
Patient reports eating everything at home. Works late and eats carbohydrate dense/sweet foods. Drinks 2% milk frequently. Patient frequently consumes carbohydrate dense/sweet foods upon returning home from work at 12am. Reports that he will eat or drink anything, and frequently drinks 2% milk. Patient does not follow any therapeutic diet at home. Frequently consumes carbohydrate dense/sweet foods upon returning home from work at 12am. Reports that he will eat or drink anything, and frequently drinks regular milk. Patient does not follow any therapeutic diet at home. Patient frequently consumes carbohydrate dense/sweet foods upon returning home from work at 12am.

## 2020-10-21 NOTE — DIETITIAN INITIAL EVALUATION ADULT. - OTHER INFO
Patient is a 50y M who presented to the ED at [place] with an NSTEMI, transferred to [facility] on 10/19/20 and is now 2 days s/p cardiac catheterization, PIC-ANTONIA LAD. Patient used tobacco for 20 years before quitting in 2017 and has a family history of MI, per patient profile and H&P. Endocrine consult note states that patient had polyuria and polydyspia for several months, and is newly diagnosed with Type 2 Diabetes Mellitus this admission. Patient reports that his mother and older brother both have been diagnosed with T2DM. His diet typically consists of sugary or carbohydrate rich snacks when he gets home from work at 12am, and will eat or drink anything, but dislikes fish. He lives at home with his wife, with whom he shares cooking responsibility. Patient is a 50y M who presented to the ED at [place] with an NSTEMI, transferred to [facility] on 10/19/20 and is now 2 days s/p cardiac catheterization, PIC-ANTONIA LAD. No report of nausea/vomiting, diarrhea/constipation or chewing/swallowing difficulty. Patient used tobacco for 20 years before quitting in 2017 and has a family history of MI, per patient profile and H&P. Endocrine consult note states that patient had polyuria and polydyspia for several months, and is newly diagnosed with Type 2 Diabetes Mellitus this admission. Per note, goal LDL for this patient is 55. Patient reports that his mother and older brother both have been diagnosed with T2DM and is familiar with diet restrictions for diabetes. His usual diet PTA consisted of sugary or carbohydrate rich snacks when he got home from work at 12am, and he cooks chicken with the skin on and frequently drinks 2% milk. He reports that he will eat or drink anything, but dislikes fish and fat-free milk. He lives at home with his wife, with whom he shares cooking responsibility. Patient has been started on a consistent carbohydrate and DASH diet, and has been given verbal and written education for both. RD remains available. Patient is a 50y M who presented to the ED at [place] with an NSTEMI, transferred to [facility] on 10/19/20 and is now 2 days s/p cardiac catheterization, PIC-ANTONIA LAD. No report of nausea/vomiting, diarrhea/constipation or chewing/swallowing difficulty. Patient used tobacco for 20 years before quitting in 2017 and has a family history of MI, per patient profile and H&P. Endocrine consult note states that patient had polyuria and polydyspia for several months, and is newly diagnosed with Type 2 Diabetes Mellitus this admission. Per note, goal LDL for this patient is 55. Patient reports that his mother and older brother both have been diagnosed with T2DM and is familiar with diet restrictions for diabetes. His usual diet PTA consisted of sugary or carbohydrate rich snacks when he got home from work at 12am, and he cooks chicken with the skin on and frequently drinks 2% milk. He reports that he will eat or drink anything, but dislikes fish and fat-free milk. He lives at home with his wife, with whom he shares cooking responsibility. Patient has been started on a consistent carbohydrate and DASH diet, and has been given verbal and written education for both diets. Patient was agreeable to following up with an outpatient dietitian and was instructed to find an outpatient RD close to home through his insurance. RD remains available. Patient is a 50y M who presented to the ED at [place] with an NSTEMI, transferred to [facility] on 10/19/20 and is now 2 days s/p cardiac catheterization, PIC-ANTONIA LAD. No report of nausea/vomiting, diarrhea/constipation or chewing/swallowing difficulty. Patient used tobacco for 20 years before quitting in 2017 and has a family history of MI, per patient profile and H&P. Endocrine consult note states that patient had polyuria and polydyspia for several months, and is newly diagnosed with Type 2 Diabetes Mellitus this admission. Serum glucose upon admission to [facility] was 337 mg/dL. Patient reports that his mother and older brother both have been diagnosed with T2DM and is familiar with diet restrictions for diabetes. His usual diet PTA consisted of sugary or carbohydrate rich snacks when he got home from work at 12am, and he cooks chicken with the skin on and frequently drinks 2% milk. He reports that he will eat or drink anything, but dislikes fish and fat-free milk. He lives at home with his wife, with whom he shares cooking responsibility. Patient has been started on a consistent carbohydrate and DASH diet, and has been given verbal and written education for both diets. Patient was agreeable to following up with an outpatient dietitian and was instructed to find an outpatient RD close to home through his insurance. RD remains available. Patient is a 50y M who presented to the ED at [place] with an NSTEMI, transferred to [facility] on 10/19/20 and is now 2 days s/p cardiac catheterization, PIC-ANTONIA LAD. No report of nausea/vomiting, diarrhea/constipation or chewing/swallowing difficulty. Patient used tobacco for 20 years before quitting in 2017 and has a family history of MI, per patient profile and H&P. Endocrine consult note states that patient had polyuria and polydyspia for several months, and is newly diagnosed with Type 2 Diabetes Mellitus this admission. Serum glucose upon admission to [facility] was 337 mg/dL. Patient reports that his mother and older brother both have been diagnosed with T2DM and is familiar with diet restrictions for diabetes. His usual diet PTA consisted of sugary or carbohydrate rich snacks when he got home from work at 12am, and he cooks chicken with the skin on and frequently drinks regular milk. He reports that he will eat or drink anything, but dislikes fish and fat-free milk and will only go as low as 2%. He lives at home with his wife, with whom he shares cooking responsibility. Patient has been started on a consistent carbohydrate and DASH diet, and has been given verbal and written education for both diets. Patient was agreeable to following up with an outpatient dietitian and was instructed to find an outpatient RD close to home through his insurance. RD remains available. Patient is a 50y M who presented to the ED at [place] with an NSTEMI, transferred to [facility] on 10/19/20 and is now 2 days s/p cardiac catheterization, PIC-ANTONIA LAD. Patient with good appetite, no report of nausea/vomiting, diarrhea/constipation or chewing/swallowing difficulty. Per chart review, patient is a former tobacco user. Endocrine consult note states that patient had polyuria and polydyspia for several months, and is newly diagnosed with Type 2 Diabetes Mellitus this admission. Serum glucose upon admission to [facility] was 337 mg/dL (10/19). Patient reports that his mother and older brother both have been diagnosed with T2DM and is familiar with diet restrictions for diabetes. His usual diet PTA consisted of sugary or carbohydrate rich snacks when he got home from work at 12am, he cooks chicken with the skin on and frequently drinks regular milk. He reports that he will eat or drink anything, but dislikes fish and fat-free milk and will only go as low as 2%. He lives at home with his wife, with whom he shares cooking responsibility. Patient's current diet order includes consistent carbohydrate and DASH restrictions, and has been given verbal and written education for both diets. RDN answered questions and concerns relative to diet, and patient verbalized understanding. Patient was advised to follow up with and outpatient dietitian for better glucose management. RD remains available.

## 2020-10-21 NOTE — PROGRESS NOTE ADULT - SUBJECTIVE AND OBJECTIVE BOX
Chief Complaint: DM2    History: Tolerating po  No hypoglycemia events   for dc home today    MEDICATIONS  (STANDING):  aspirin enteric coated 81 milliGRAM(s) Oral daily  atorvastatin 80 milliGRAM(s) Oral at bedtime  dextrose 5%. 1000 milliLiter(s) (50 mL/Hr) IV Continuous <Continuous>  dextrose 50% Injectable 12.5 Gram(s) IV Push once  dextrose 50% Injectable 25 Gram(s) IV Push once  dextrose 50% Injectable 25 Gram(s) IV Push once  insulin glargine Injectable (LANTUS) 22 Unit(s) SubCutaneous at bedtime  insulin lispro (ADMELOG) corrective regimen sliding scale   SubCutaneous three times a day before meals  insulin lispro (ADMELOG) corrective regimen sliding scale   SubCutaneous at bedtime  insulin lispro Injectable (ADMELOG) 7 Unit(s) SubCutaneous three times a day before meals  metoprolol tartrate 25 milliGRAM(s) Oral two times a day  sodium chloride 0.9% lock flush 3 milliLiter(s) IV Push every 8 hours  sodium chloride 0.9%. 1000 milliLiter(s) (75 mL/Hr) IV Continuous <Continuous>  sodium chloride 0.9%. 500 milliLiter(s) (75 mL/Hr) IV Continuous <Continuous>  ticagrelor 90 milliGRAM(s) Oral every 12 hours    MEDICATIONS  (PRN):  dextrose 40% Gel 15 Gram(s) Oral once PRN Blood Glucose LESS THAN 70 milliGRAM(s)/deciliter  glucagon  Injectable 1 milliGRAM(s) IntraMuscular once PRN Glucose LESS THAN 70 milligrams/deciliter      Allergies    No Known Allergies    Intolerances      Review of Systems:    ALL OTHER SYSTEMS REVIEWED AND NEGATIVE      PHYSICAL EXAM:  VITALS: T(C): 36.4 (10-21-20 @ 11:56)  T(F): 97.6 (10-21-20 @ 11:56), Max: 98.5 (10-20-20 @ 21:07)  HR: 84 (10-21-20 @ 11:56) (84 - 97)  BP: 118/73 (10-21-20 @ 11:56) (105/66 - 131/88)  RR:  (16 - 18)  SpO2:  (97% - 100%)  Wt(kg): --  GENERAL: NAD, well-groomed, well-developed  EYES: No proptosis, no lid lag, anicteric  HEENT:  Atraumatic, Normocephalic, moist mucous membranes  RESPIRATORY: nonlabored respirations  PSYCH: Alert and oriented x 3, normal affect, normal mood    CAPILLARY BLOOD GLUCOSE      POCT Blood Glucose.: 264 mg/dL (21 Oct 2020 11:41)  POCT Blood Glucose.: 241 mg/dL (21 Oct 2020 07:50)  POCT Blood Glucose.: 277 mg/dL (20 Oct 2020 20:55)      10-21    131<L>  |  98  |  20  ----------------------------<  222<H>  4.1   |  18<L>  |  0.83    EGFR if : 120  EGFR if non : 103    Ca    9.0      10-21  Mg     2.1     10-21  Phos  2.7     10-21    TPro  7.4  /  Alb  4.3  /  TBili  1.7<H>  /  DBili  x   /  AST  60<H>  /  ALT  33  /  AlkPhos  90  10-20      A1C with Estimated Average Glucose: 11.2 % (10-20-20 @ 06:09)      Thyroid Function Tests:  10-20 @ 06:09 TSH 2.54 FreeT4 -- T3 -- Anti TPO -- Anti Thyroglobulin Ab -- TSI --

## 2020-10-21 NOTE — PROGRESS NOTE ADULT - SUBJECTIVE AND OBJECTIVE BOX
SUBJECTIVE / OVERNIGHT EVENTS: pt denies chest pain, shortness of breath       MEDICATIONS  (STANDING):  aspirin enteric coated 81 milliGRAM(s) Oral daily  atorvastatin 80 milliGRAM(s) Oral at bedtime  dextrose 5%. 1000 milliLiter(s) (50 mL/Hr) IV Continuous <Continuous>  dextrose 50% Injectable 12.5 Gram(s) IV Push once  dextrose 50% Injectable 25 Gram(s) IV Push once  dextrose 50% Injectable 25 Gram(s) IV Push once  insulin glargine Injectable (LANTUS) 25 Unit(s) SubCutaneous at bedtime  insulin lispro (ADMELOG) corrective regimen sliding scale   SubCutaneous three times a day before meals  insulin lispro (ADMELOG) corrective regimen sliding scale   SubCutaneous at bedtime  insulin lispro Injectable (ADMELOG) 9 Unit(s) SubCutaneous three times a day before meals  metoprolol tartrate 25 milliGRAM(s) Oral two times a day  sodium chloride 0.9% lock flush 3 milliLiter(s) IV Push every 8 hours  sodium chloride 0.9%. 1000 milliLiter(s) (75 mL/Hr) IV Continuous <Continuous>  sodium chloride 0.9%. 500 milliLiter(s) (75 mL/Hr) IV Continuous <Continuous>  ticagrelor 90 milliGRAM(s) Oral every 12 hours    MEDICATIONS  (PRN):  dextrose 40% Gel 15 Gram(s) Oral once PRN Blood Glucose LESS THAN 70 milliGRAM(s)/deciliter  glucagon  Injectable 1 milliGRAM(s) IntraMuscular once PRN Glucose LESS THAN 70 milligrams/deciliter    Vital Signs Last 24 Hrs  T(C): 37.1 (21 Oct 2020 17:29), Max: 37.1 (21 Oct 2020 17:29)  T(F): 98.7 (21 Oct 2020 17:29), Max: 98.7 (21 Oct 2020 17:29)  HR: 93 (21 Oct 2020 17:29) (84 - 97)  BP: 139/85 (21 Oct 2020 17:29) (118/73 - 139/85)  BP(mean): --  RR: 18 (21 Oct 2020 17:29) (16 - 18)  SpO2: 100% (21 Oct 2020 17:29) (97% - 100%)    CAPILLARY BLOOD GLUCOSE      POCT Blood Glucose.: 221 mg/dL (21 Oct 2020 17:14)  POCT Blood Glucose.: 264 mg/dL (21 Oct 2020 11:41)  POCT Blood Glucose.: 241 mg/dL (21 Oct 2020 07:50)    I&O's Summary      Constitutional: No fever, fatigue  Skin: No rash.  Eyes: No recent vision problems or eye pain.  ENT: No congestion, ear pain, or sore throat.  Cardiovascular: No chest pain or palpation.  Respiratory: No cough, shortness of breath, congestion, or wheezing.  Gastrointestinal: No abdominal pain, nausea, vomiting, or diarrhea.  Genitourinary: No dysuria.  Musculoskeletal: No joint swelling.  Neurologic: No headache.    PHYSICAL EXAM:  GENERAL: NAD  EYES: EOMI, PERRLA  NECK: Supple, No JVD  CHEST/LUNG: cta hola  HEART:  S1 , S2 +  ABDOMEN: soft , bs+  EXTREMITIES:  no edema  NEUROLOGY:alert awake oriented       LABS:                        15.7   10.57 )-----------( 261      ( 21 Oct 2020 06:52 )             47.3     10-21    131<L>  |  98  |  20  ----------------------------<  222<H>  4.1   |  18<L>  |  0.83    Ca    9.0      21 Oct 2020 06:50  Phos  2.7     10-21  Mg     2.1     10-21    TPro  7.4  /  Alb  4.3  /  TBili  1.7<H>  /  DBili  x   /  AST  60<H>  /  ALT  33  /  AlkPhos  90  10-20              RADIOLOGY & ADDITIONAL TESTS:    Imaging Personally Reviewed:    Consultant(s) Notes Reviewed:      Care Discussed with Consultants/Other Providers:

## 2020-10-21 NOTE — DISCHARGE NOTE PROVIDER - NSDCCPCAREPLAN_GEN_ALL_CORE_FT
PRINCIPAL DISCHARGE DIAGNOSIS  Diagnosis: Non-ST elevation MI (NSTEMI)  Assessment and Plan of Treatment: Please follow up with your primary care physician regarding your hospitalization and take all medications prescribed.      SECONDARY DISCHARGE DIAGNOSES  Diagnosis: Type 2 diabetes mellitus with hyperglycemia, without long-term current use of insulin  Assessment and Plan of Treatment: Type 2 diabetes mellitus with hyperglycemia, without long-term current use of insulin. Please follow up with your primary care physician regarding your hospitalization and take all medications prescribed.    Diagnosis: Other hyperlipidemia  Assessment and Plan of Treatment: Other hyperlipidemia. Please follow up with your primary care physician regarding your hospitalization and take all medications prescribed.    Diagnosis: HTN (hypertension), benign  Assessment and Plan of Treatment: HTN (hypertension), benign. Please follow up with your primary care physician regarding your hospitalization and take all medications prescribed.

## 2020-10-21 NOTE — DIETITIAN INITIAL EVALUATION ADULT. - PERSON TAUGHT/METHOD
----- Message from Jeremías Awan MD sent at 6/20/2018  7:20 PM CDT -----  Abdominal aortic screening normal   written material/verbal instruction

## 2020-10-21 NOTE — PROGRESS NOTE ADULT - SUBJECTIVE AND OBJECTIVE BOX
DATE OF SERVICE:10/21/2020  Patient was seen and examined by me,interim events noted.    PRESENTING CC:Chest pain    HPI and HOSPITAL COURSE: 49 y/o M with no known PMHx, has not seen his doctor in a few years, presents to [facility] ED with several days of chest pain.ECG revealed ST depressions refers to anterior chest pressure,initial Troponins elevated at 13,transferred to [facility] for urgent cardiac cath-revealed multivessel CAD opted for PCUI had ANTONIA-LAD scheduled for staged PCI-LCx remains chest pain free no overnight events noted,Right Radial site no hematoma pulses intact      PMH -reviewed admission note, no change since admission  Heart failure: Acute [ ] Chronic [ ] Acute on chronic [ ] Diastolic [ ] Systolic [ ] Combined systolic and diastolic[ ]  DAYAN[ ]  ATN[ ]  CKD I [ ] CKDII [ ] CKD III [ ] CKD IV [ ] CKD V [ ] ESRD[ ]    MEDICATIONS  (STANDING):  aspirin enteric coated 81 milliGRAM(s) Oral daily  atorvastatin 80 milliGRAM(s) Oral at bedtime  insulin glargine Injectable (LANTUS) 22 Unit(s) SubCutaneous at bedtime  insulin lispro (ADMELOG) corrective regimen sliding scale   SubCutaneous three times a day before meals  insulin lispro (ADMELOG) corrective regimen sliding scale   SubCutaneous at bedtime  insulin lispro Injectable (ADMELOG) 7 Unit(s) SubCutaneous three times a day before meals  metoprolol tartrate 25 milliGRAM(s) Oral two times a day  sodium chloride 0.9% lock flush 3 milliLiter(s) IV Push every 8 hours  sodium chloride 0.9%. 1000 milliLiter(s) (75 mL/Hr) IV Continuous <Continuous>  sodium chloride 0.9%. 500 milliLiter(s) (75 mL/Hr) IV Continuous <Continuous>  ticagrelor 90 milliGRAM(s) Oral every 12 hours    MEDICATIONS  (PRN):  dextrose 40% Gel 15 Gram(s) Oral once PRN Blood Glucose LESS THAN 70 milliGRAM(s)/deciliter  glucagon  Injectable 1 milliGRAM(s) IntraMuscular once PRN Glucose LESS THAN 70 milligrams/deciliter              REVIEW OF SYSTEMS:  Constitutional: [ ] fever, [ ]weight loss,  [ ]fatigue  Eyes: [ ] visual changes  Respiratory: [ ]shortness of breath;  [ ] cough, [ ]wheezing, [ ]chills, [ ]hemoptysis  Cardiovascular: [x ] chest pain, [ ]palpitations, [ ]dizziness,  [ ]leg swelling[ ]orthopnea[ ]PND  Gastrointestinal: [ ] abdominal pain, [ ]nausea, [ ]vomiting,  [ ]diarrhea   Genitourinary: [ ] dysuria, [ ] hematuria  Neurologic: [ ] headaches [ ] tremors[ ]weakness  Skin: [ ] itching, [ ]burning, [ ] rashes  Endocrine: [ ] heat or cold intolerance  Musculoskeletal: [ ] joint pain or swelling; [ ] muscle, back, or extremity pain  Psychiatric: [ ] depression, [ ]anxiety, [ ]mood swings, or [ ]difficulty sleeping  Hematologic: [ ] easy bruising, [ ] bleeding gums    [x] All remaining systems negative except as per above.   [ ]Unable to obtain.    Vital Signs Last 24 Hrs  T(C): 36.8 (21 Oct 2020 05:06), Max: 36.9 (20 Oct 2020 21:07)  T(F): 98.2 (21 Oct 2020 05:06), Max: 98.5 (20 Oct 2020 21:07)  HR: 97 (21 Oct 2020 05:06) (87 - 97)  BP: 118/91 (21 Oct 2020 05:06) (105/66 - 131/88)  RR: 16 (21 Oct 2020 05:06) (16 - 18)  SpO2: 97% (21 Oct 2020 05:06) (97% - 100%)  I&O's Summary      PHYSICAL EXAM:  General: No acute distress BMI-31  HEENT: EOMI, PERRL  Neck: Supple, [ ] JVD  Lungs: Equal air entry bilaterally; [ ] rales [ ] wheezing [ ] rhonchi  Heart: Regular rate and rhythm; [x ] murmur   2/6 [x ] systolic [ ] diastolic [ ] radiation[ ] rubs [ ]  gallops  Abdomen: Nontender, bowel sounds present  Extremities: No clubbing, cyanosis, [ ] edema  Nervous system:  Alert & Oriented X3, no focal deficits  Psychiatric: Normal affect  Skin: No rashes or lesions    LABS:  10-20    136  |  100  |  19  ----------------------------<  255<H>  3.9   |  20<L>  |  0.80    Ca    8.8      20 Oct 2020 19:01  Phos  3.1     10-20  Mg     2.0     10-20    TPro  7.4  /  Alb  4.3  /  TBili  1.7<H>  /  DBili  x   /  AST  60<H>  /  ALT  33  /  AlkPhos  90  10-20    Creatinine Trend: 0.80<--, 0.83<--, 1.16<--                        16.0   11.60 )-----------( 262      ( 20 Oct 2020 06:09 )             47.5     PT/INR - ( 19 Oct 2020 13:43 )   PT: 10.6 sec;   INR: 0.89 ratio         PTT - ( 19 Oct 2020 13:43 )  PTT:29.2 sec    Cardiac Enzymes: CARDIAC MARKERS ( 19 Oct 2020 13:43 )  13.500 ng/mL / x     / 824 U/L / x     / x            ECG [my interpretation]:    TELEMETRY:Sinus rhythm    ECHO:  Study Date: 10/20/2020  CONCLUSIONS:  1. Normal mitral valve. Minimal mitral regurgitation.  2. Normal left ventricular internal dimensions and wall thicknesses.  3. Endocardiumnot well visualized; grossly normal left ventricular systolic function.EF 64%  Endocardial visualization enhanced with intravenous injection of echo contrast (Definity).  4. The right ventricle is not well visualized; grossly normal right ventricular systolic function.    IMPRESSION AND PLAN:      Problem/Recommendation - 1:  Problem: NSTEMI (non-ST elevated myocardial infarction). Recommendation: Chest pain ACS-NSTEMI  Elevated troponins with ECG changes and active angina  Cardiac cath-Multivessel CAD s/p PCI-ANTONIA LAD ,had staged PCI-LCx/RCA  Continue DAPT  TTE LV Systolic function-Normal LV systolic Function EF 55%    Problem/Recommendation - 2:  ·  Problem: Hypertensive urgency.  Recommendation: No prior history HTN  Likely in setting of acute MI  BP better continue Metoprolol      Problem/Recommendation - 3:  ·  Problem: Type 2 diabetes mellitus with hyperglycemia, without long-term current use of insulin.  Recommendation: Family Hx DM  New onset T2DM  A1C with Estimated Average Glucose: 11.2 %  -POCT Blood Glucose.: 277 <---178 <---305 mg/dL   -Endocrine consult noted   DATE OF SERVICE:10/21/2020  Patient was seen and examined by me,interim events noted.    PRESENTING CC:Chest pain    HPI and HOSPITAL COURSE: 51 y/o M with no known PMHx, has not seen his doctor in a few years, presents to [facility] ED with several days of chest pain.ECG revealed ST depressions refers to anterior chest pressure,initial Troponins elevated at 13,transferred to [facility] for urgent cardiac cath-revealed multivessel CAD opted for PCUI had ANTONIA-LCx scheduled for staged PCI-LAD remains chest pain free no overnight events noted,Right Radial site no hematoma pulses intact      PMH -reviewed admission note, no change since admission  Heart failure: Acute [ ] Chronic [ ] Acute on chronic [ ] Diastolic [ ] Systolic [ ] Combined systolic and diastolic[ ]  DAYAN[ ]  ATN[ ]  CKD I [ ] CKDII [ ] CKD III [ ] CKD IV [ ] CKD V [ ] ESRD[ ]    MEDICATIONS  (STANDING):  aspirin enteric coated 81 milliGRAM(s) Oral daily  atorvastatin 80 milliGRAM(s) Oral at bedtime  insulin glargine Injectable (LANTUS) 22 Unit(s) SubCutaneous at bedtime  insulin lispro (ADMELOG) corrective regimen sliding scale   SubCutaneous three times a day before meals  insulin lispro (ADMELOG) corrective regimen sliding scale   SubCutaneous at bedtime  insulin lispro Injectable (ADMELOG) 7 Unit(s) SubCutaneous three times a day before meals  metoprolol tartrate 25 milliGRAM(s) Oral two times a day  sodium chloride 0.9% lock flush 3 milliLiter(s) IV Push every 8 hours  sodium chloride 0.9%. 1000 milliLiter(s) (75 mL/Hr) IV Continuous <Continuous>  sodium chloride 0.9%. 500 milliLiter(s) (75 mL/Hr) IV Continuous <Continuous>  ticagrelor 90 milliGRAM(s) Oral every 12 hours    MEDICATIONS  (PRN):  dextrose 40% Gel 15 Gram(s) Oral once PRN Blood Glucose LESS THAN 70 milliGRAM(s)/deciliter  glucagon  Injectable 1 milliGRAM(s) IntraMuscular once PRN Glucose LESS THAN 70 milligrams/deciliter              REVIEW OF SYSTEMS:  Constitutional: [ ] fever, [ ]weight loss,  [ ]fatigue  Eyes: [ ] visual changes  Respiratory: [ ]shortness of breath;  [ ] cough, [ ]wheezing, [ ]chills, [ ]hemoptysis  Cardiovascular: [x ] chest pain, [ ]palpitations, [ ]dizziness,  [ ]leg swelling[ ]orthopnea[ ]PND  Gastrointestinal: [ ] abdominal pain, [ ]nausea, [ ]vomiting,  [ ]diarrhea   Genitourinary: [ ] dysuria, [ ] hematuria  Neurologic: [ ] headaches [ ] tremors[ ]weakness  Skin: [ ] itching, [ ]burning, [ ] rashes  Endocrine: [ ] heat or cold intolerance  Musculoskeletal: [ ] joint pain or swelling; [ ] muscle, back, or extremity pain  Psychiatric: [ ] depression, [ ]anxiety, [ ]mood swings, or [ ]difficulty sleeping  Hematologic: [ ] easy bruising, [ ] bleeding gums    [x] All remaining systems negative except as per above.   [ ]Unable to obtain.    Vital Signs Last 24 Hrs  T(C): 36.8 (21 Oct 2020 05:06), Max: 36.9 (20 Oct 2020 21:07)  T(F): 98.2 (21 Oct 2020 05:06), Max: 98.5 (20 Oct 2020 21:07)  HR: 97 (21 Oct 2020 05:06) (87 - 97)  BP: 118/91 (21 Oct 2020 05:06) (105/66 - 131/88)  RR: 16 (21 Oct 2020 05:06) (16 - 18)  SpO2: 97% (21 Oct 2020 05:06) (97% - 100%)  I&O's Summary      PHYSICAL EXAM:  General: No acute distress BMI-31  HEENT: EOMI, PERRL  Neck: Supple, [ ] JVD  Lungs: Equal air entry bilaterally; [ ] rales [ ] wheezing [ ] rhonchi  Heart: Regular rate and rhythm; [x ] murmur   2/6 [x ] systolic [ ] diastolic [ ] radiation[ ] rubs [ ]  gallops  Abdomen: Nontender, bowel sounds present  Extremities: No clubbing, cyanosis, [ ] edema  Nervous system:  Alert & Oriented X3, no focal deficits  Psychiatric: Normal affect  Skin: No rashes or lesions    LABS:  10-20    136  |  100  |  19  ----------------------------<  255<H>  3.9   |  20<L>  |  0.80    Ca    8.8      20 Oct 2020 19:01  Phos  3.1     10-20  Mg     2.0     10-20    TPro  7.4  /  Alb  4.3  /  TBili  1.7<H>  /  DBili  x   /  AST  60<H>  /  ALT  33  /  AlkPhos  90  10-20    Creatinine Trend: 0.80<--, 0.83<--, 1.16<--                        16.0   11.60 )-----------( 262      ( 20 Oct 2020 06:09 )             47.5     PT/INR - ( 19 Oct 2020 13:43 )   PT: 10.6 sec;   INR: 0.89 ratio         PTT - ( 19 Oct 2020 13:43 )  PTT:29.2 sec    Cardiac Enzymes: CARDIAC MARKERS ( 19 Oct 2020 13:43 )  13.500 ng/mL / x     / 824 U/L / x     / x            ECG [my interpretation]:    TELEMETRY:Sinus rhythm    ECHO:  Study Date: 10/20/2020  CONCLUSIONS:  1. Normal mitral valve. Minimal mitral regurgitation.  2. Normal left ventricular internal dimensions and wall thicknesses.  3. Endocardiumnot well visualized; grossly normal left ventricular systolic function.EF 64%  Endocardial visualization enhanced with intravenous injection of echo contrast (Definity).  4. The right ventricle is not well visualized; grossly normal right ventricular systolic function.    IMPRESSION AND PLAN:      Problem/Recommendation - 1:  Problem: NSTEMI (non-ST elevated myocardial infarction). Recommendation: Chest pain ACS-NSTEMI  Elevated troponins with ECG changes and active angina  Cardiac cath-Multivessel CAD s/p PCI-ANTONIA LCx ,had staged PCI-LAD  -Staged PCI to RCA if patient has refractory angina  Continue DAPT  TTE LV Systolic function-Normal LV systolic Function EF 55%    Problem/Recommendation - 2:  ·  Problem: Hypertensive urgency.  Recommendation: No prior history HTN  Likely in setting of acute MI  BP better continue Metoprolol      Problem/Recommendation - 3:  ·  Problem: Type 2 diabetes mellitus with hyperglycemia, without long-term current use of insulin.  Recommendation: Family Hx DM  New onset T2DM  A1C with Estimated Average Glucose: 11.2 %  -POCT Blood Glucose.: 277 <---178 <---305 mg/dL   -Endocrine consult noted

## 2020-10-21 NOTE — DIETITIAN INITIAL EVALUATION ADULT. - SIGNS/SYMPTOMS
BMI of 34.5 A1c of 11.5, serum glucose of 255; BMI of 34.5 BMI of 34.5; A1c of 11.2% BMI of 34.5 kg/m2; A1c of 11.2%

## 2020-10-23 RX ORDER — METFORMIN HYDROCHLORIDE 850 MG/1
1 TABLET ORAL
Qty: 60 | Refills: 0
Start: 2020-10-23 | End: 2020-11-21

## 2020-11-04 ENCOUNTER — APPOINTMENT (OUTPATIENT)
Dept: ENDOCRINOLOGY | Facility: CLINIC | Age: 50
End: 2020-11-04

## 2020-12-15 ENCOUNTER — INPATIENT (INPATIENT)
Facility: HOSPITAL | Age: 50
LOS: 0 days | Discharge: HOME | End: 2020-12-16
Attending: INTERNAL MEDICINE | Admitting: INTERNAL MEDICINE
Payer: COMMERCIAL

## 2020-12-15 VITALS
RESPIRATION RATE: 18 BRPM | SYSTOLIC BLOOD PRESSURE: 139 MMHG | HEIGHT: 71 IN | TEMPERATURE: 98 F | HEART RATE: 88 BPM | OXYGEN SATURATION: 100 % | DIASTOLIC BLOOD PRESSURE: 92 MMHG

## 2020-12-15 DIAGNOSIS — Z98.890 OTHER SPECIFIED POSTPROCEDURAL STATES: Chronic | ICD-10-CM

## 2020-12-15 DIAGNOSIS — I24.9 ACUTE ISCHEMIC HEART DISEASE, UNSPECIFIED: ICD-10-CM

## 2020-12-15 LAB
ALBUMIN SERPL ELPH-MCNC: 4.5 G/DL — SIGNIFICANT CHANGE UP (ref 3.3–5)
ALP SERPL-CCNC: 85 U/L — SIGNIFICANT CHANGE UP (ref 40–120)
ALT FLD-CCNC: 22 U/L — SIGNIFICANT CHANGE UP (ref 4–41)
ANION GAP SERPL CALC-SCNC: 12 MMOL/L — SIGNIFICANT CHANGE UP (ref 7–14)
APTT BLD: 34.8 SEC — SIGNIFICANT CHANGE UP (ref 27–36.3)
AST SERPL-CCNC: 21 U/L — SIGNIFICANT CHANGE UP (ref 4–40)
BASOPHILS # BLD AUTO: 0.04 K/UL — SIGNIFICANT CHANGE UP (ref 0–0.2)
BASOPHILS NFR BLD AUTO: 0.4 % — SIGNIFICANT CHANGE UP (ref 0–2)
BILIRUB SERPL-MCNC: 0.9 MG/DL — SIGNIFICANT CHANGE UP (ref 0.2–1.2)
BUN SERPL-MCNC: 23 MG/DL — SIGNIFICANT CHANGE UP (ref 7–23)
CALCIUM SERPL-MCNC: 9.8 MG/DL — SIGNIFICANT CHANGE UP (ref 8.4–10.5)
CHLORIDE SERPL-SCNC: 103 MMOL/L — SIGNIFICANT CHANGE UP (ref 98–107)
CO2 SERPL-SCNC: 25 MMOL/L — SIGNIFICANT CHANGE UP (ref 22–31)
CREAT SERPL-MCNC: 1.16 MG/DL — SIGNIFICANT CHANGE UP (ref 0.5–1.3)
EOSINOPHIL # BLD AUTO: 0.1 K/UL — SIGNIFICANT CHANGE UP (ref 0–0.5)
EOSINOPHIL NFR BLD AUTO: 1 % — SIGNIFICANT CHANGE UP (ref 0–6)
GLUCOSE BLDC GLUCOMTR-MCNC: 82 MG/DL — SIGNIFICANT CHANGE UP (ref 70–99)
GLUCOSE BLDC GLUCOMTR-MCNC: 92 MG/DL — SIGNIFICANT CHANGE UP (ref 70–99)
GLUCOSE SERPL-MCNC: 154 MG/DL — HIGH (ref 70–99)
HCT VFR BLD CALC: 44.4 % — SIGNIFICANT CHANGE UP (ref 39–50)
HGB BLD-MCNC: 13.9 G/DL — SIGNIFICANT CHANGE UP (ref 13–17)
IANC: 7.35 K/UL — SIGNIFICANT CHANGE UP (ref 1.5–8.5)
IMM GRANULOCYTES NFR BLD AUTO: 0.3 % — SIGNIFICANT CHANGE UP (ref 0–1.5)
INR BLD: 0.94 RATIO — SIGNIFICANT CHANGE UP (ref 0.88–1.17)
LYMPHOCYTES # BLD AUTO: 1.81 K/UL — SIGNIFICANT CHANGE UP (ref 1–3.3)
LYMPHOCYTES # BLD AUTO: 18.5 % — SIGNIFICANT CHANGE UP (ref 13–44)
MCHC RBC-ENTMCNC: 27.7 PG — SIGNIFICANT CHANGE UP (ref 27–34)
MCHC RBC-ENTMCNC: 31.3 GM/DL — LOW (ref 32–36)
MCV RBC AUTO: 88.6 FL — SIGNIFICANT CHANGE UP (ref 80–100)
MONOCYTES # BLD AUTO: 0.48 K/UL — SIGNIFICANT CHANGE UP (ref 0–0.9)
MONOCYTES NFR BLD AUTO: 4.9 % — SIGNIFICANT CHANGE UP (ref 2–14)
NEUTROPHILS # BLD AUTO: 7.35 K/UL — SIGNIFICANT CHANGE UP (ref 1.8–7.4)
NEUTROPHILS NFR BLD AUTO: 74.9 % — SIGNIFICANT CHANGE UP (ref 43–77)
NRBC # BLD: 0 /100 WBCS — SIGNIFICANT CHANGE UP
NRBC # FLD: 0 K/UL — SIGNIFICANT CHANGE UP
PLATELET # BLD AUTO: 296 K/UL — SIGNIFICANT CHANGE UP (ref 150–400)
POTASSIUM SERPL-MCNC: 3.9 MMOL/L — SIGNIFICANT CHANGE UP (ref 3.5–5.3)
POTASSIUM SERPL-SCNC: 3.9 MMOL/L — SIGNIFICANT CHANGE UP (ref 3.5–5.3)
PROT SERPL-MCNC: 7.5 G/DL — SIGNIFICANT CHANGE UP (ref 6–8.3)
PROTHROM AB SERPL-ACNC: 10.8 SEC — SIGNIFICANT CHANGE UP (ref 9.8–13.1)
RBC # BLD: 5.01 M/UL — SIGNIFICANT CHANGE UP (ref 4.2–5.8)
RBC # FLD: 14.5 % — SIGNIFICANT CHANGE UP (ref 10.3–14.5)
SARS-COV-2 RNA SPEC QL NAA+PROBE: SIGNIFICANT CHANGE UP
SODIUM SERPL-SCNC: 140 MMOL/L — SIGNIFICANT CHANGE UP (ref 135–145)
TROPONIN T, HIGH SENSITIVITY RESULT: 88 NG/L — CRITICAL HIGH
WBC # BLD: 9.81 K/UL — SIGNIFICANT CHANGE UP (ref 3.8–10.5)
WBC # FLD AUTO: 9.81 K/UL — SIGNIFICANT CHANGE UP (ref 3.8–10.5)

## 2020-12-15 PROCEDURE — 99285 EMERGENCY DEPT VISIT HI MDM: CPT

## 2020-12-15 PROCEDURE — 71046 X-RAY EXAM CHEST 2 VIEWS: CPT | Mod: 26

## 2020-12-15 RX ORDER — ATORVASTATIN CALCIUM 80 MG/1
40 TABLET, FILM COATED ORAL AT BEDTIME
Refills: 0 | Status: DISCONTINUED | OUTPATIENT
Start: 2020-12-15 | End: 2020-12-16

## 2020-12-15 RX ORDER — SODIUM CHLORIDE 9 MG/ML
1000 INJECTION, SOLUTION INTRAVENOUS
Refills: 0 | Status: DISCONTINUED | OUTPATIENT
Start: 2020-12-15 | End: 2020-12-16

## 2020-12-15 RX ORDER — DEXTROSE 50 % IN WATER 50 %
25 SYRINGE (ML) INTRAVENOUS ONCE
Refills: 0 | Status: DISCONTINUED | OUTPATIENT
Start: 2020-12-15 | End: 2020-12-16

## 2020-12-15 RX ORDER — SODIUM CHLORIDE 9 MG/ML
1000 INJECTION INTRAMUSCULAR; INTRAVENOUS; SUBCUTANEOUS
Refills: 0 | Status: DISCONTINUED | OUTPATIENT
Start: 2020-12-15 | End: 2020-12-16

## 2020-12-15 RX ORDER — DEXTROSE 50 % IN WATER 50 %
15 SYRINGE (ML) INTRAVENOUS ONCE
Refills: 0 | Status: DISCONTINUED | OUTPATIENT
Start: 2020-12-15 | End: 2020-12-16

## 2020-12-15 RX ORDER — TICAGRELOR 90 MG/1
90 TABLET ORAL EVERY 12 HOURS
Refills: 0 | Status: DISCONTINUED | OUTPATIENT
Start: 2020-12-15 | End: 2020-12-16

## 2020-12-15 RX ORDER — EMPAGLIFLOZIN, METFORMIN HYDROCHLORIDE 10; 1000 MG/1; MG/1
1 TABLET, EXTENDED RELEASE ORAL
Qty: 0 | Refills: 0 | DISCHARGE

## 2020-12-15 RX ORDER — INSULIN LISPRO 100/ML
VIAL (ML) SUBCUTANEOUS
Refills: 0 | Status: DISCONTINUED | OUTPATIENT
Start: 2020-12-15 | End: 2020-12-16

## 2020-12-15 RX ORDER — INSULIN LISPRO 100/ML
VIAL (ML) SUBCUTANEOUS AT BEDTIME
Refills: 0 | Status: DISCONTINUED | OUTPATIENT
Start: 2020-12-15 | End: 2020-12-16

## 2020-12-15 RX ORDER — METOPROLOL TARTRATE 50 MG
25 TABLET ORAL
Refills: 0 | Status: DISCONTINUED | OUTPATIENT
Start: 2020-12-15 | End: 2020-12-16

## 2020-12-15 RX ORDER — ICOSAPENT ETHYL 500 MG/1
2 CAPSULE, LIQUID FILLED ORAL
Qty: 0 | Refills: 0 | DISCHARGE

## 2020-12-15 RX ORDER — ASPIRIN/CALCIUM CARB/MAGNESIUM 324 MG
81 TABLET ORAL DAILY
Refills: 0 | Status: DISCONTINUED | OUTPATIENT
Start: 2020-12-15 | End: 2020-12-16

## 2020-12-15 RX ORDER — DULAGLUTIDE 4.5 MG/.5ML
0 INJECTION, SOLUTION SUBCUTANEOUS
Qty: 0 | Refills: 0 | DISCHARGE

## 2020-12-15 RX ORDER — DEXTROSE 50 % IN WATER 50 %
12.5 SYRINGE (ML) INTRAVENOUS ONCE
Refills: 0 | Status: DISCONTINUED | OUTPATIENT
Start: 2020-12-15 | End: 2020-12-16

## 2020-12-15 RX ORDER — ASPIRIN/CALCIUM CARB/MAGNESIUM 324 MG
81 TABLET ORAL ONCE
Refills: 0 | Status: COMPLETED | OUTPATIENT
Start: 2020-12-15 | End: 2020-12-15

## 2020-12-15 RX ORDER — GLUCAGON INJECTION, SOLUTION 0.5 MG/.1ML
1 INJECTION, SOLUTION SUBCUTANEOUS ONCE
Refills: 0 | Status: DISCONTINUED | OUTPATIENT
Start: 2020-12-15 | End: 2020-12-16

## 2020-12-15 RX ORDER — ATORVASTATIN CALCIUM 80 MG/1
1 TABLET, FILM COATED ORAL
Qty: 0 | Refills: 0 | DISCHARGE

## 2020-12-15 RX ORDER — ACETAMINOPHEN 500 MG
650 TABLET ORAL ONCE
Refills: 0 | Status: COMPLETED | OUTPATIENT
Start: 2020-12-15 | End: 2020-12-15

## 2020-12-15 RX ADMIN — TICAGRELOR 90 MILLIGRAM(S): 90 TABLET ORAL at 17:59

## 2020-12-15 RX ADMIN — Medication 650 MILLIGRAM(S): at 17:27

## 2020-12-15 RX ADMIN — Medication 81 MILLIGRAM(S): at 09:20

## 2020-12-15 RX ADMIN — ATORVASTATIN CALCIUM 40 MILLIGRAM(S): 80 TABLET, FILM COATED ORAL at 22:47

## 2020-12-15 RX ADMIN — Medication 650 MILLIGRAM(S): at 17:38

## 2020-12-15 RX ADMIN — SODIUM CHLORIDE 75 MILLILITER(S): 9 INJECTION INTRAMUSCULAR; INTRAVENOUS; SUBCUTANEOUS at 16:50

## 2020-12-15 NOTE — ED PROVIDER NOTE - ATTENDING CONTRIBUTION TO CARE
Attending Statement: I have personally seen and examined this patient. I have fully participated in the care of this patient. I have reviewed all pertinent clinical information, including history physical exam, plan and the Resident's note and agree except as noted  50 year old male with PMH HTN, HLD, DM, CAD s/p stent (October 2020, on DAPT) presents with chest pressure since yesterday evening. Pressure like on the left side chest, radiate to the left arm. +exertional. not pleuritic. Denies associated n/v/d/palpitations or SOB. no fever/chills. No chest pain now. no travel no sick contact.   Vital signs noted. sitting up,  appears comfortable. normal S1-S2 No resp distress. able to speak in full and clear sentences. no wheeze, rales or stridor. soft nontender abdomen. no  rebound. no guarding. no sign of trauma. no CVAT no pedal edema. no calf tenderness. normal pulses bilateral feet.  plan labs, ekg, cxr, tele monitor, cardiology consult, tba

## 2020-12-15 NOTE — H&P CARDIOLOGY - HISTORY OF PRESENT ILLNESS
50 year old male with HTN, HLD, DM-II, CAD s/p stent (October 2020, on DAPT) who presented to [facility] ED 12/15 complaining of left sided chest pressure radiating to left arm that started yesterday evening while working.  Denies any fevers, shortness of breath, nausea, vomiting, diarrhea, back pain, numbness, weakness, headache, vision change, or rash. Pt states he called his cardiologist Dr. Murrieta and was advised to go to the Emergency Dept. in ED patient with 4-7/10 chest pressure, 1st troponin positive for NSTEMI and recommended for cath.   In light of patients CAD history, symptoms and abnormal noninvasive test findings there is high suspicion for CAD progression. Patient is now referred to [facility] for a cardiac catheterization with possible PTCA/stent.     Denies fever, cough, chills, headache, flu like symptoms, sick contact or recent travel  COVID PCR not detected on 12/15/2020 50 year old male with HTN, HLD, DM-II, CAD s/p stent (October 2020, on DAPT) who presented to [facility] ED 12/15 complaining of left sided chest pressure radiating to left arm that started yesterday evening while working.  Denies any fevers, shortness of breath, nausea, vomiting, diarrhea, back pain, numbness, weakness, headache, vision change, or rash. Pt states he called his cardiologist Dr. Murrieta and was advised to go to the Emergency Dept as patient has known RCA disease he was to return for elective intervention. In ED patient with 4-7/10 chest pressure, 1st troponin positive for NSTEMI and recommended for cath.   In light of patients CAD history, symptoms and abnormal noninvasive test findings there is high suspicion for CAD progression. Patient is now referred to [facility] for a cardiac catheterization with possible PTCA/stent.     Denies fever, cough, chills, headache, flu like symptoms, sick contact or recent travel  COVID PCR not detected on 12/15/2020

## 2020-12-15 NOTE — CONSULT NOTE ADULT - SUBJECTIVE AND OBJECTIVE BOX
DATE OF SERVICE:    Patient was seen,examined and evaluated  by me.    CHIEF COMPLAINT:    HPI:HPI:      PAST MEDICAL & SURGICAL HISTORY:  CAD (coronary artery disease)    DM (diabetes mellitus)    HLD (hyperlipidemia)    HTN (hypertension)    Tobacco abuse    S/P arthroscopy of right knee    S/P correction of deviated nasal septum        MEDICATIONS  (STANDING):    MEDICATIONS  (PRN):      FAMILY HISTORY:  Family history of MI (myocardial infarction) (Father)      No family history of premature coronary artery disease or sudden cardiac death    SOCIAL HISTORY:  Smoking-[ ] Active  [ ] Former [ ] Non Smoker  Alcohol-[ ] Denies [ ] Social [ ] Daily  Ilicit Drug use-[ ] Denies [ ] Active user    REVIEW OF SYSTEMS:  Constitutional: [ ] fever, [ ]weight loss, [ ]fatigue   Activity [ ] Bedbound,[ ] Ambulates [ ] Unassisted[ ] Cane/Walker [ ] Assistence.  Effort tolerance:[ ] Excellent [ ] Good [ ] Fair [ ] Poor [ ]  Eyes: [ ] visual changes  Respiratory: [ ]shortness of breath;  [ ] cough, [ ]wheezing, [ ]chills, [ ]hemoptysis  Cardiovascular: [ ] chest pain, [ ]palpitations, [ ]dizziness,  [ ]leg swelling[ ]orthopnea [ ]PND  Gastrointestinal: [ ] abdominal pain, [ ]nausea, [ ]vomiting,  [ ]diarrhea,[ ]constipation  Genitourinary: [ ] dysuria, [ ] hematuria  Neurologic: [ ] headaches [ ] tremors[ ] weakness  Skin: [ ] itching, [ ]burning, [ ] rashes  Endocrine: [ ] heat or cold intolerance  Musculoskeletal: [ ] joint pain or swelling; [ ] muscle, back, or extremity pain  Psychiatric: [ ] depression, [ ]anxiety, [ ]mood swings, or [ ]difficulty sleeping  Hematologic: [ ] easy bruising, [ ] bleeding gums       [ x] All others negative	  [ ] Unable to obtain    Vital Signs Last 24 Hrs  T(C): 36.7 (15 Dec 2020 13:44), Max: 36.8 (15 Dec 2020 08:46)  T(F): 98.1 (15 Dec 2020 13:44), Max: 98.3 (15 Dec 2020 08:46)  HR: 77 (15 Dec 2020 13:44) (77 - 88)  BP: 118/74 (15 Dec 2020 13:44) (118/74 - 143/82)  BP(mean): --  RR: 15 (15 Dec 2020 13:44) (15 - 18)  SpO2: 99% (15 Dec 2020 13:44) (99% - 100%)  I&O's Summary      PHYSICAL EXAM:  General: No acute distress BMI-  HEENT: EOMI, PERRL[ ] Icteric  Neck: Supple, No JVD  Lungs: Equal air entry bilaterally; [ ] Rales [ ] Rhonchi [ ] Wheezing  Heart: Regular rate and rhythm;[ ] Murmurs-   /6 [ ] Systolic [ ] Diastolic [ ] Radiation,No rubs, or gallops  Abdomen: Nontender, bowel sounds present  Extremities: No clubbing, cyanosis, or edema[ ] Calf tenderness  Nervous system:  Alert & Oriented X3, no focal deficits  Psychiatric: Normal affect  Skin: No rashes or lesions      LABS:  12-15    140  |  103  |  23  ----------------------------<  154<H>  3.9   |  25  |  1.16    Ca    9.8      15 Dec 2020 09:49    TPro  7.5  /  Alb  4.5  /  TBili  0.9  /  DBili  x   /  AST  21  /  ALT  22  /  AlkPhos  85  12-15    Creatinine Trend: 1.16<--                        13.9   9.81  )-----------( 296      ( 15 Dec 2020 09:49 )             44.4     PT/INR - ( 15 Dec 2020 09:49 )   PT: 10.8 sec;   INR: 0.94 ratio         PTT - ( 15 Dec 2020 09:49 )  PTT:34.8 sec    Lipid Panel:   Cardiac Enzymes:           RADIOLOGY:    ECG [my interpretation]:    TELEMETRY:    ECHO:    STRESS TEST:    CATHETERIZATION: DATE OF SERVICE:  12/15/2020  Patient was seen,examined and evaluated  by me.    CHIEF COMPLAINT:Chest Pain    HPI:50 year old male with HTN, HLD, DM-II, CAD s/p PCI-mLCx and  pLAD redidual RCA disease (October 2020, on DAPT) who presented to [facility] ED 12/15 complaining of left sided chest pressure radiating to left arm that started yesterday evening while working.  Denies any fevers, shortness of breath, nausea, vomiting, diarrhea, back pain, numbness, weakness, headache, vision change, or rash. Pt had called me early today morning and was advised to go to the Emergency Dept as patient has known RCA disease he was to return for elective intervention. In ED patient with 4-7/10 chest pressure, 1st troponin positive for NSTEMI and recommended for cath.   In light of patients CAD history, symptoms and abnormal noninvasive test findings there is high suspicion for CAD progression. Patient is now referred to [facility] for a cardiac catheterization with possible PTCA/stent.     Denies fever, cough, chills, headache, flu like symptoms, sick contact or recent travel  COVID PCR not detected on 12/15/2020      PAST MEDICAL & SURGICAL HISTORY:  CAD (coronary artery disease)    DM (diabetes mellitus)    HLD (hyperlipidemia)    HTN (hypertension)    Tobacco abuse    S/P arthroscopy of right knee    S/P correction of deviated nasal septum        MEDICATIONS  (STANDING):  · 	Brilinta (ticagrelor) 90 mg oral tablet: Last Dose Taken: 15-Dec-2020 AM, 1 tab(s) orally every 12 hours  · 	metoprolol tartrate 25 mg oral tablet: Last Dose Taken:  , 1 tab(s) orally 2 times a day  · 	aspirin 81 mg oral delayed release tablet: Last Dose Taken: 15-Dec-2020 AM, 1 tab(s) orally once a day  · 	atorvastatin 40 mg oral tablet: Last Dose Taken:  , 1 tab(s) orally once a day (at bedtime)  · 	Trulicity Pen 1.5 mg/0.5 mL subcutaneous solution: Last Dose Taken:  , subcutaneously once a week Friday  · 	Synjardy 12.5 mg-1000 mg oral tablet: 1 tab(s) orally 2 times a day  · 	Vascepa 1 g oral capsule: 2 cap(s) orally 2 times a day        FAMILY HISTORY:  Family history of MI (myocardial infarction) (Father)  Family history of premature coronary artery disease or sudden cardiac death    SOCIAL HISTORY:  Smoking-[ ] Active  [x ] Former [ ] Non Smoker  Alcohol-[x ] Denies [ ] Social [ ] Daily  Ilicit Drug use-[x ] Denies [ ] Active user    REVIEW OF SYSTEMS:  Constitutional: [ ] fever, [ ]weight loss, [ ]fatigue   Activity [ ] Bedbound,[x ] Ambulates [x ] Unassisted[ ] Cane/Walker [ ] Assistence.  Effort tolerance:[x ] Excellent [ ] Good [ ] Fair [ ] Poor [ ]  Eyes: [ ] visual changes  Respiratory: [x ]shortness of breath;  [ ] cough, [ ]wheezing, [ ]chills, [ ]hemoptysis  Cardiovascular: [x ] chest pain, [ ]palpitations, [ ]dizziness,  [ ]leg swelling[ ]orthopnea [ ]PND  Gastrointestinal: [ ] abdominal pain, [ ]nausea, [ ]vomiting,  [ ]diarrhea,[ ]constipation  Genitourinary: [ ] dysuria, [ ] hematuria  Neurologic: [ ] headaches [ ] tremors[ ] weakness  Skin: [ ] itching, [ ]burning, [ ] rashes  Endocrine: [ ] heat or cold intolerance  Musculoskeletal: [ ] joint pain or swelling; [ ] muscle, back, or extremity pain  Psychiatric: [ ] depression, [ ]anxiety, [ ]mood swings, or [ ]difficulty sleeping  Hematologic: [ ] easy bruising, [ ] bleeding gums       [ x] All others negative	  [ ] Unable to obtain    Vital Signs Last 24 Hrs  T(C): 36.7 (15 Dec 2020 13:44), Max: 36.8 (15 Dec 2020 08:46)  T(F): 98.1 (15 Dec 2020 13:44), Max: 98.3 (15 Dec 2020 08:46)  HR: 77 (15 Dec 2020 13:44) (77 - 88)  BP: 118/74 (15 Dec 2020 13:44) (118/74 - 143/82)  RR: 15 (15 Dec 2020 13:44) (15 - 18)  SpO2: 99% (15 Dec 2020 13:44) (99% - 100%)  I&O's Summary      PHYSICAL EXAM:  General: No acute distress BMI-29  HEENT: EOMI, PERRL[ ] Icteric  Neck: Supple, No JVD  Lungs: Equal air entry bilaterally; [ ] Rales [ ] Rhonchi [ ] Wheezing  Heart: Regular rate and rhythm;[x ] Murmurs-  2 /6 [x ] Systolic [ ] Diastolic [ ] Radiation,No rubs, or gallops  Abdomen: Nontender, bowel sounds present  Extremities: No clubbing, cyanosis, or edema[ ] Calf tenderness  Nervous system:  Alert & Oriented X3, no focal deficits  Psychiatric: Normal affect  Skin: No rashes or lesions      LABS:  12-15    140  |  103  |  23  ----------------------------<  154<H>  3.9   |  25  |  1.16    Ca    9.8      15 Dec 2020 09:49    TPro  7.5  /  Alb  4.5  /  TBili  0.9  /  DBili  x   /  AST  21  /  ALT  22  /  AlkPhos  85  12-15    Creatinine Trend: 1.16<--                        13.9   9.81  )-----------( 296      ( 15 Dec 2020 09:49 )             44.4     PT/INR - ( 15 Dec 2020 09:49 )   PT: 10.8 sec;   INR: 0.94 ratio    PTT - ( 15 Dec 2020 09:49 )  PTT:34.8 sec    COVID-19 PCR: NotDetected  (12.15.20 @ 09:49)         Troponin T, High Sensitivity Result: 88 ng/L    RADIOLOGY:   XR CHEST PA LAT 2V    IMPRESSION: Normal chest      ECG [my interpretation]:Sinus rhythm at 76 BPM Normal Axis No acute ST T wave abnormalities  ms      ECHO:Study Date: 10/20/2020  CONCLUSIONS:  1. Normal mitral valve. Minimal mitral regurgitation.  2. Normal left ventricular internal dimensions and wall thicknesses.  3. Endocardiumnot well visualized; grossly normal left ventricular systolic function.  Endocardial visualization enhanced with intravenous injection of echo contrast (Definity).EF 64%  4. The right ventricle is not well visualized; grossly normal right ventricular systolic function.    CATHETERIZATION: Study date: 10/19/2020  DIAGNOSTIC RECOMMENDATIONS: S/p successful ANTONIA to anatomic mCx with minimal residual stenosis with ANDREA 0 flow pre and TIMI3 flow post.       Study date: 10/20/2020  DIAGNOSTIC IMPRESSIONS: Successful PCI to severe stenosis proximal LAD  using 3.5mm Promus ANTOINA  IVUS was used for PCI optimization

## 2020-12-15 NOTE — CONSULT NOTE ADULT - PROBLEM SELECTOR RECOMMENDATION 4
Lipid Profile in AM (10.20.20 @ 06:09)   Cholesterol, Serum: 226 mg/dL   Triglycerides, Serum: 384 mg/dL   HDL Cholesterol, Serum: 38 mg/dL   Direct LDL: 152 mg/dL  Continue statins

## 2020-12-15 NOTE — ED PROVIDER NOTE - PHYSICAL EXAMINATION
CONSTITUTIONAL: non-toxic, well appearing  SKIN: no rash, no petechiae.  EYES: pink conjunctiva, anicteric  ENT: tongue and uvular midline, no exudates, moist mucous membranes  NECK: Supple; no meningismus, no JVD  CARD: RRR, no murmurs, equal radial pulses bilaterally 2+, no chest wall tenderness  RESP: CTAB, no respiratory distress  ABD: Soft, non-tender, non-distended, no peritoneal signs  EXT: Normal ROM x4. No edema.   NEURO: Alert, oriented. Neuro exam nonfocal.   PSYCH: Cooperative, appropriate.

## 2020-12-15 NOTE — CHART NOTE - NSCHARTNOTEFT_GEN_A_CORE
Patient is s/p cardiac cath via right radial access.  Site is stable with no bruits, hematoma, active bleed or swelling.  Dressing is clean/dry/intact.  Radial pulse is palpable.  Patient denies pain, numbness, tingling, CP, SOB. VSS. Will continue to monitor.     T(C): 36.8 (12-15-20 @ 22:51), Max: 36.8 (12-15-20 @ 08:46)  HR: 70 (12-15-20 @ 22:51) (70 - 88)  BP: 123/64 (12-15-20 @ 22:51) (118/74 - 143/82)  RR: 16 (12-15-20 @ 22:51) (15 - 18)  SpO2: 100% (12-15-20 @ 22:51) (99% - 100%)

## 2020-12-15 NOTE — ED PROVIDER NOTE - CLINICAL SUMMARY MEDICAL DECISION MAKING FREE TEXT BOX
Gris-PGY2: 50 year old male with PMH HTN, HLD, DM, CAD s/p stent (October 2020, on DAPT) presents with chest pressure since yesterday evening. Pt with exertional chest pressure radiating to left arm concerning for ACS. CP not pleuritic in nature, no leg swelling, calf pain, or clinical signs/symptoms of DVT/PE. Initial EKG showing no acute ischemic changes, repeat EKG showing no dynamic changes. Pt on DAPT, comfortable in NAD. Discussed with cardiologist Dr. Murrieta, states pt will likely have cardiac catheterization later today. Will obtain labs, imaging, supportive care, and admission for further evaluation and management.

## 2020-12-15 NOTE — ED PROVIDER NOTE - PROGRESS NOTE DETAILS
Gris-PGY2: spoke to Dr. Murrieta, states pt will likely go to cath lab later today, states no heparin at this time. Gris-PGY2: spoke to Dr. Murrieta re: elevated troponin. States pt will go to cath lab pending negative COVID. Pt accepted to Dr. Murrieta's service.

## 2020-12-15 NOTE — CONSULT NOTE ADULT - ASSESSMENT
pt with above history p/w chest pain   cardiac cath , cont aspirin ,     dm2 -iss    Htn - cont bb
50 year old male with HTN, HLD, DM-II, CAD s/p stent (October 2020, on DAPT) who presented to [facility] ED 12/15 complaining of left sided chest pressure radiating to left arm that started yesterday evening while working.

## 2020-12-15 NOTE — CONSULT NOTE ADULT - PROBLEM SELECTOR RECOMMENDATION 9
Presenting with chest pain elevated troponins no ECG changes  On DAPT  Scheduled for Cardiac cath today

## 2020-12-15 NOTE — ED PROVIDER NOTE - OBJECTIVE STATEMENT
50 year old male with PMH HTN, HLD, DM, CAD s/p stent (October 2020, on DAPT) presents with chest pressure since yesterday evening. Pt reports left sided chest pressure radiating to left arm that started yesterday evening while working. Pt reports worsening with exertion and improved with rest, denies any worsening with PO intake, position change, or movement. Denies any fevers, shortness of breath, nausea, vomiting, diarrhea, back pain, numbness, weakness, headache, vision change, or rash. Pt states he called his cardiologist Dr. Murrieta and was advised to go to the Emergency Dept. Pt states he currently has chest pressure, pain scale: 4-7/10 at this time. Denies any additional complaints.

## 2020-12-15 NOTE — CONSULT NOTE ADULT - ATTENDING COMMENTS
Patient was seen and examined by me on 12/15/2020,interim events noted,labs and radiology studies reviewed.  Derik Murrieta MD,FACC.  [address] [place] [address].  [id number].  [phone]

## 2020-12-15 NOTE — CONSULT NOTE ADULT - SUBJECTIVE AND OBJECTIVE BOX
History of Present Illness      50 year old male with HTN, HLD, DM-II, CAD s/p stent (October 2020, on DAPT) who presented to [facility] ED 12/15 complaining of left sided chest pressure radiating to left arm that started yesterday evening while working.  Denies any fevers, shortness of breath, nausea, vomiting, diarrhea, back pain, numbness, weakness, headache, vision change, or rash. Pt states he called his cardiologist Dr. Murrieta and was advised to go to the Emergency Dept as patient has known RCA disease he was to return for elective intervention. In ED patient with 4-7/10 chest pressure, 1st troponin positive for NSTEMI and recommended for cath.   In light of patients CAD history, symptoms and abnormal noninvasive test findings there is high suspicion for CAD progression. Patient is now referred to [facility] for a cardiac catheterization with possible PTCA/stent.     Denies fever, cough, chills, headache, flu like symptoms, sick contact or recent travel  COVID PCR not detected on 12/15/2020      Past Medical History:  CAD (coronary artery disease)    DM (diabetes mellitus)    HLD (hyperlipidemia)    HTN (hypertension)    NSTEMI (non-ST elevated myocardial infarction)    Stented coronary artery    Tobacco abuse.    Past Surgical History:  S/P arthroscopy of right knee    S/P correction of deviated nasal septum.      Social History:  · Lives With  ambulates independently      Tobacco Usage:  · Tobacco Usage: Former smoker     · Tobacco Type: cigarettes     · Number of Packs per Day: 1     · Number of yrs: 25     · Pack yrs: 25     · Longest Period Tobacco-Free: quit 5 years ago      Family History:  Father  Still living? No  Family history of MI (myocardial infarction), Age at diagnosis: Age Unknown.      Home Medications:   * Patient Currently Takes Medications as of 15-Dec-2020 15:10 documented in Structured Notes  · 	Brilinta (ticagrelor) 90 mg oral tablet: Last Dose Taken: 15-Dec-2020 AM, 1 tab(s) orally every 12 hours  · 	metoprolol tartrate 25 mg oral tablet: Last Dose Taken:  , 1 tab(s) orally 2 times a day  · 	aspirin 81 mg oral delayed release tablet: Last Dose Taken: 15-Dec-2020 AM, 1 tab(s) orally once a day  · 	atorvastatin 40 mg oral tablet: Last Dose Taken:  , 1 tab(s) orally once a day (at bedtime)  · 	Trulicity Pen 1.5 mg/0.5 mL subcutaneous solution: Last Dose Taken:  , subcutaneously once a week Friday  · 	Synjardy 12.5 mg-1000 mg oral tablet: 1 tab(s) orally 2 times a day  · 	Vascepa 1 g oral capsule: 2 cap(s) orally 2 times a day    Review of Systems:   Respiratory / Cardiology / Neurology:  · Respiratory and Thorax  negative    · Negative Cardiovascular Symptoms  as per HPI    · Neurological  negative      · Review of Systems      as per HPI  all other systems negative     Vital Signs/Physical Exam:   Physical Exam:  · Constitutional  Well-developed, well nourished    · Neck  No bruits; no thyromegaly or nodules    · Respiratory  Breath Sounds equal & clear to percussion & auscultation, no accessory muscle use    · Cardiovascular  Regular rate & rhythm, normal S1, S2; no murmurs, gallops or rubs; no S3, S4    · Gastrointestinal  Soft, non-tender, no hepatosplenomegaly, normal bowel sounds    · Extremities  No cyanosis, clubbing or edema    · Vascular  detailed exam    · Radial Pulse  1+ bilateral; andrea test normal right          · Femoral Pulse  right normal; left normal; no bruit bilateral    · DP Pulse  right normal; left normal    · Neurological  Alert & oriented; no sensory, motor or coordination deficits, normal reflexes    · Psychiatric  Affect and characteristics of appearance, verbalizations, behaviors are appropriate      Results:   Comments:  · EKG and Interpretation  reviewed and in chart      History of Present Illness      50 year old male with HTN, HLD, DM-II, CAD s/p stent (October 2020, on DAPT) who presented to [facility] ED 12/15 complaining of left sided chest pressure radiating to left arm that started yesterday evening while working.  Denies any fevers, shortness of breath, nausea, vomiting, diarrhea, back pain, numbness, weakness, headache, vision change, or rash. Pt states he called his cardiologist Dr. Murrieta and was advised to go to the Emergency Dept as patient has known RCA disease he was to return for elective intervention. In ED patient with 4-7/10 chest pressure, 1st troponin positive for NSTEMI and recommended for cath.   In light of patients CAD history, symptoms and abnormal noninvasive test findings there is high suspicion for CAD progression. Patient is now referred to [facility] for a cardiac catheterization with possible PTCA/stent.     Denies fever, cough, chills, headache, flu like symptoms, sick contact or recent travel  COVID PCR not detected on 12/15/2020      Past Medical History:  CAD (coronary artery disease)    DM (diabetes mellitus)    HLD (hyperlipidemia)    HTN (hypertension)    NSTEMI (non-ST elevated myocardial infarction)    Stented coronary artery    Tobacco abuse.    Past Surgical History:  S/P arthroscopy of right knee    S/P correction of deviated nasal septum.      Social History:  · Lives With  ambulates independently      Tobacco Usage:  · Tobacco Usage: Former smoker     · Tobacco Type: cigarettes     · Number of Packs per Day: 1     · Number of yrs: 25     · Pack yrs: 25     · Longest Period Tobacco-Free: quit 5 years ago      Family History:  Father  Still living? No  Family history of MI (myocardial infarction), Age at diagnosis: Age Unknown.      Home Medications:   * Patient Currently Takes Medications as of 15-Dec-2020 15:10 documented in Structured Notes  · 	Brilinta (ticagrelor) 90 mg oral tablet: Last Dose Taken: 15-Dec-2020 AM, 1 tab(s) orally every 12 hours  · 	metoprolol tartrate 25 mg oral tablet: Last Dose Taken:  , 1 tab(s) orally 2 times a day  · 	aspirin 81 mg oral delayed release tablet: Last Dose Taken: 15-Dec-2020 AM, 1 tab(s) orally once a day  · 	atorvastatin 40 mg oral tablet: Last Dose Taken:  , 1 tab(s) orally once a day (at bedtime)  · 	Trulicity Pen 1.5 mg/0.5 mL subcutaneous solution: Last Dose Taken:  , subcutaneously once a week Friday  · 	Synjardy 12.5 mg-1000 mg oral tablet: 1 tab(s) orally 2 times a day  · 	Vascepa 1 g oral capsule: 2 cap(s) orally 2 times a day    Review of Systems:   Respiratory / Cardiology / Neurology:  · Respiratory and Thorax  negative    · Negative Cardiovascular Symptoms  as per HPI    · Neurological  negative      · Review of Systems      as per HPI  all other systems negative     Vital Signs/Physical Exam:   Physical Exam:  · Constitutional  Well-developed, well nourished    · Neck  No bruits; no thyromegaly or nodules    · Respiratory  Breath Sounds equal & clear to percussion & auscultation, no accessory muscle use    · Cardiovascular  Regular rate & rhythm, normal S1, S2; no murmurs, gallops or rubs; no S3, S4    · Gastrointestinal  Soft, non-tender, no hepatosplenomegaly, normal bowel sounds    · Extremities  No cyanosis, clubbing or edema    · Vascular  detailed exam    · Radial Pulse  1+ bilateral; andrea test normal right          · Femoral Pulse  right normal; left normal; no bruit bilateral    · DP Pulse  right normal; left normal    · Neurological  Alert & oriented; no sensory, motor or coordination deficits, normal reflexes    · Psychiatric  Affect and characteristics of appearance, verbalizations, behaviors are appropriate      Results:   Comments:  · EKG and Interpretation  reviewed and in chart

## 2020-12-15 NOTE — ED ADULT NURSE NOTE - OBJECTIVE STATEMENT
A&Ox4 and ambulatory. Pt. states that he has a recent hx of MI 10/2020. Pt. states that he's had left sided chest pressure radiating to left arm since yesterday. Pt. states that pain is constant but fluctuates in level. Pt. endorses diaphoresis today but none noted currently. VSS. No SOB or pallor noted. EKG performed, repeat EKG to be performed.

## 2020-12-15 NOTE — ED PROVIDER NOTE - NS ED ROS FT
Review of Systems    Constitutional: (-) fever, (-) chills, (-) fatigue  HEENT: (-) sore throat, (-) hearing loss, (-) nasal congestion  Cardiovascular: (+) chest pain, (-) syncope  Respiratory: (-) cough, (-) shortness of breath  Gastrointestinal: (-) vomiting, (-) diarrhea, (-) abdominal pain  Musculoskeletal: (-) neck pain, (-) back pain, (-) joint pain  Integumentary: (-) rash, (-) edema, (-) wound  Neurological: (-) headache, (-) altered mental status    Except as documented in the HPI, all other systems are negative.

## 2020-12-15 NOTE — ED ADULT TRIAGE NOTE - CHIEF COMPLAINT QUOTE
Pt complaining of chest pain since last night. Pt states he had 2 stents placed in october. Pt denies SOB.

## 2020-12-15 NOTE — H&P CARDIOLOGY - PMH
CAD (coronary artery disease)    DM (diabetes mellitus)    HLD (hyperlipidemia)    HTN (hypertension)    NSTEMI (non-ST elevated myocardial infarction)    Stented coronary artery    Tobacco abuse

## 2020-12-16 ENCOUNTER — TRANSCRIPTION ENCOUNTER (OUTPATIENT)
Age: 50
End: 2020-12-16

## 2020-12-16 VITALS
TEMPERATURE: 98 F | DIASTOLIC BLOOD PRESSURE: 60 MMHG | SYSTOLIC BLOOD PRESSURE: 110 MMHG | HEART RATE: 70 BPM | OXYGEN SATURATION: 100 % | RESPIRATION RATE: 18 BRPM

## 2020-12-16 DIAGNOSIS — E78.2 MIXED HYPERLIPIDEMIA: ICD-10-CM

## 2020-12-16 DIAGNOSIS — I21.4 NON-ST ELEVATION (NSTEMI) MYOCARDIAL INFARCTION: ICD-10-CM

## 2020-12-16 DIAGNOSIS — I25.110 ATHEROSCLEROTIC HEART DISEASE OF NATIVE CORONARY ARTERY WITH UNSTABLE ANGINA PECTORIS: ICD-10-CM

## 2020-12-16 DIAGNOSIS — E11.9 TYPE 2 DIABETES MELLITUS WITHOUT COMPLICATIONS: ICD-10-CM

## 2020-12-16 LAB
ANION GAP SERPL CALC-SCNC: 14 MMOL/L — SIGNIFICANT CHANGE UP (ref 7–14)
BUN SERPL-MCNC: 17 MG/DL — SIGNIFICANT CHANGE UP (ref 7–23)
CALCIUM SERPL-MCNC: 9.2 MG/DL — SIGNIFICANT CHANGE UP (ref 8.4–10.5)
CHLORIDE SERPL-SCNC: 106 MMOL/L — SIGNIFICANT CHANGE UP (ref 98–107)
CO2 SERPL-SCNC: 21 MMOL/L — LOW (ref 22–31)
CREAT SERPL-MCNC: 1.08 MG/DL — SIGNIFICANT CHANGE UP (ref 0.5–1.3)
GLUCOSE BLDC GLUCOMTR-MCNC: 93 MG/DL — SIGNIFICANT CHANGE UP (ref 70–99)
GLUCOSE SERPL-MCNC: 81 MG/DL — SIGNIFICANT CHANGE UP (ref 70–99)
HCT VFR BLD CALC: 40 % — SIGNIFICANT CHANGE UP (ref 39–50)
HGB BLD-MCNC: 12.7 G/DL — LOW (ref 13–17)
MAGNESIUM SERPL-MCNC: 2.2 MG/DL — SIGNIFICANT CHANGE UP (ref 1.6–2.6)
MCHC RBC-ENTMCNC: 28 PG — SIGNIFICANT CHANGE UP (ref 27–34)
MCHC RBC-ENTMCNC: 31.8 GM/DL — LOW (ref 32–36)
MCV RBC AUTO: 88.3 FL — SIGNIFICANT CHANGE UP (ref 80–100)
NRBC # BLD: 0 /100 WBCS — SIGNIFICANT CHANGE UP
NRBC # FLD: 0 K/UL — SIGNIFICANT CHANGE UP
PHOSPHATE SERPL-MCNC: 4.4 MG/DL — SIGNIFICANT CHANGE UP (ref 2.5–4.5)
PLATELET # BLD AUTO: 277 K/UL — SIGNIFICANT CHANGE UP (ref 150–400)
POTASSIUM SERPL-MCNC: 4 MMOL/L — SIGNIFICANT CHANGE UP (ref 3.5–5.3)
POTASSIUM SERPL-SCNC: 4 MMOL/L — SIGNIFICANT CHANGE UP (ref 3.5–5.3)
RBC # BLD: 4.53 M/UL — SIGNIFICANT CHANGE UP (ref 4.2–5.8)
RBC # FLD: 14.5 % — SIGNIFICANT CHANGE UP (ref 10.3–14.5)
SODIUM SERPL-SCNC: 141 MMOL/L — SIGNIFICANT CHANGE UP (ref 135–145)
WBC # BLD: 8.74 K/UL — SIGNIFICANT CHANGE UP (ref 3.8–10.5)
WBC # FLD AUTO: 8.74 K/UL — SIGNIFICANT CHANGE UP (ref 3.8–10.5)

## 2020-12-16 RX ADMIN — TICAGRELOR 90 MILLIGRAM(S): 90 TABLET ORAL at 05:06

## 2020-12-16 RX ADMIN — Medication 25 MILLIGRAM(S): at 05:06

## 2020-12-16 NOTE — DISCHARGE NOTE PROVIDER - NSDCCPCAREPLAN_GEN_ALL_CORE_FT
PRINCIPAL DISCHARGE DIAGNOSIS  Diagnosis: ACS (acute coronary syndrome)  Assessment and Plan of Treatment: Admitted for chest pain and cardiac catheterization was completed 12/15 with successful stent placement. Advised to continue medications as prescribed and follow up with your Cardiologist (Dr. Murrieta) in 1-2 weeks.      SECONDARY DISCHARGE DIAGNOSES  Diagnosis: Mixed hyperlipidemia  Assessment and Plan of Treatment: Maintain adequate control of your cholesterol levels. Follow up with PCP for ongoing medical management. Continue medications as prescribed. Low cholesterol diet.    Diagnosis: Type 2 diabetes mellitus without complication, with long-term current use of insulin  Assessment and Plan of Treatment: Maintain adequate glycemic control. Monitor your sugars. Goal HgA1C < 7.0%. Low carb diet.

## 2020-12-16 NOTE — PROGRESS NOTE ADULT - ASSESSMENT
Patient stable for discharge today.Discussed in detail concerning diet adherence to DAPT   Follow up in office in 2 weeks

## 2020-12-16 NOTE — DISCHARGE NOTE PROVIDER - CARE PROVIDER_API CALL
Derik Murrieta)  Cardiology  [address] [id number]  Phone: [phone]  Fax: [phone]  Follow Up Time:

## 2020-12-16 NOTE — DISCHARGE NOTE PROVIDER - HOSPITAL COURSE
HPI:  50 year old male with HTN, HLD, DM-II, CAD s/p stent (October 2020, on DAPT) who presented to [facility] ED 12/15 complaining of left sided chest pressure radiating to left arm that started yesterday evening while working.  Denies any fevers, shortness of breath, nausea, vomiting, diarrhea, back pain, numbness, weakness, headache, vision change, or rash. Pt states he called his cardiologist Dr. Murrieta and was advised to go to the Emergency Dept as patient has known RCA disease he was to return for elective intervention. In ED patient with 4-7/10 chest pressure, 1st troponin positive for NSTEMI and recommended for cath.   In light of patients CAD history, symptoms and abnormal noninvasive test findings there is high suspicion for CAD progression. Patient is now referred to [facility] for a cardiac catheterization with possible PTCA/stent.     Admitted for cardiac cath with PCI to severe stenosis in proximal LAD. Pt was monitored overnight after stent and pt remained hemodynamically stable.     Case reviewed with attending who cleared for discharge. Plan discussed with patient who consented and agreed with plan of care.

## 2020-12-16 NOTE — PROGRESS NOTE ADULT - SUBJECTIVE AND OBJECTIVE BOX
DATE OF SERVICE: 12/16/2020 seen and examined ,interim events noted.Consultant notes ,Labs,Telemetry reviewed by me  /  /PRESENTING CC:Chest pain    HPI and HOSPITAL COURSE: HPI:  50 year old male with HTN, HLD, DM-II, CAD s/p stent (October 2020, on DAPT) who presented to [facility] ED 12/15 complaining of left sided chest pressure radiating to left arm  In ED patient with 4-7/10 chest pressure, 1st troponin positive for NSTEMI and recommended for cath.   In light of patients CAD history, symptoms and abnormal noninvasive test findings there is high suspicion for CAD progression.     Denies fever, cough, chills, headache, flu like symptoms, sick contact or recent travel  COVID PCR not detected on 12/15/2020 (15 Dec 2020 13:52)      INTERIM EVENTS:Awake alert no further chest pain      PMH -reviewed admission note, no change since admission  Heart Failure: Acute [ ] Chronic [ ] Acute on Chronic [ ] Diastolic [ ] Systolic [ ] Combined Systolic and Diastolic[ ]  DAYAN[ ]  ATN[ ]  CKD I [ ] CKDII [ ] CKD III [ ] CKD IV [ ] CKD V [ ] ESRD[ ]  HTN[x] CVA[ ] DM[x] COPD[ ] COVID[ ] AF[ ]  PPM[ ] ICD[ ]    MEDICATIONS  (STANDING):  aspirin enteric coated 81 milliGRAM(s) Oral daily  atorvastatin 40 milliGRAM(s) Oral at bedtime  glucagon  Injectable 1 milliGRAM(s) IntraMuscular once  insulin lispro (ADMELOG) corrective regimen sliding scale   SubCutaneous three times a day before meals  insulin lispro (ADMELOG) corrective regimen sliding scale   SubCutaneous at bedtime  metoprolol tartrate 25 milliGRAM(s) Oral two times a day  sodium chloride 0.9%. 1000 milliLiter(s) (75 mL/Hr) IV Continuous <Continuous>  ticagrelor 90 milliGRAM(s) Oral every 12 hours          REVIEW OF SYSTEMS:  Constitutional: [ ] fever, [ ]weight loss,  [ ]fatigue  Eyes: [ ] visual changes  Respiratory: [ ]shortness of breath;  [ ] cough, [ ]wheezing, [ ]chills, [ ]hemoptysis  Cardiovascular: [ ] chest pain, [ ]palpitations, [ ]dizziness,  [ ]leg swelling[ ]orthopnea[ ]PND  Gastrointestinal: [ ] abdominal pain, [ ]nausea, [ ]vomiting,  [ ]diarrhea [ ]Constipation [ ]Melena  Genitourinary: [ ] dysuria, [ ] hematuria [ ]Butler  Neurologic: [ ] headaches [ ] tremors[ ]weakness [ ]Paralysis Right[ ] Left[ ]  Skin: [ ] itching, [ ]burning, [ ] rashes  Endocrine: [ ] heat or cold intolerance  Musculoskeletal: [ ] joint pain or swelling; [ ] muscle, back, or extremity pain  Psychiatric: [ ] depression, [ ]anxiety, [ ]mood swings, or [ ]difficulty sleeping  Hematologic: [ ] easy bruising, [ ] bleeding gums    [x] All remaining systems negative except as per above.   [ ]Unable to obtain.    Vital Signs Last 24 Hrs  T(C): 36.9 (16 Dec 2020 05:03), Max: 36.9 (16 Dec 2020 05:03)  T(F): 98.4 (16 Dec 2020 05:03), Max: 98.4 (16 Dec 2020 05:03)  HR: 77 (16 Dec 2020 05:03) (70 - 88)  BP: 108/66 (16 Dec 2020 05:03) (108/66 - 143/82)  RR: 17 (16 Dec 2020 05:03) (15 - 18)  SpO2: 100% (16 Dec 2020 05:03) (99% - 100%)  I&O's Summary      PHYSICAL EXAM:  General: No acute distress BMI-29  HEENT: EOMI, PERRL  Neck: Supple, [ ] JVD  Lungs: Equal air entry bilaterally; [ ] rales [ ] wheezing [ ] rhonchi  Heart: Regular rate and rhythm; [x] murmur  2/6 [x] systolic [ ] diastolic [ ] radiation[ ] rubs [ ]  gallops  Abdomen: Nontender, bowel sounds present  Extremities: No clubbing, cyanosis, [ ] edema [ ]Pulses  equal and intact  Nervous system:  Alert & Oriented X3, no focal deficits  Psychiatric: Normal affect  Skin: No rashes or lesions    LABS:  12-15    140  |  103  |  23  ----------------------------<  154<H>  3.9   |  25  |  1.16    Ca    9.8      15 Dec 2020 09:49    TPro  7.5  /  Alb  4.5  /  TBili  0.9  /  DBili  x   /  AST  21  /  ALT  22  /  AlkPhos  85  12-15    Creatinine Trend: 1.16<--                        13.9   9.81  )-----------( 296      ( 15 Dec 2020 09:49 )             44.4     PT/INR - ( 15 Dec 2020 09:49 )   PT: 10.8 sec;   INR: 0.94 ratio         PTT - ( 15 Dec 2020 09:49 )  PTT:34.8 sec        TELEMETRY:Reviewed monitor tracings-Sinus rhythm no arrhythmias        IMPRESSION AND PLAN:      50 year old male with HTN, HLD, DM-II, CAD s/p stent (October 2020, on DAPT) who presented to [facility] ED 12/15 complaining of left sided chest pressure radiating to left arm that started yesterday evening while working.      Problem/Recommendation - 1:  Problem: NSTEMI (non-ST elevated myocardial infarction). Recommendation: Presenting with chest pain elevated troponins no ECG changes  On DAPT  Had cardiac cath s/p PCI-RCA      Problem/Recommendation - 2:  ·  Problem: Coronary artery disease involving native coronary artery of native heart with unstable angina pectoris.  Recommendation: S/P PCI pLAD and mLCx Oct 2020  TTE EF 64%.     Problem/Recommendation - 3:  ·  Problem: Type 2 diabetes mellitus without complication, with long-term current use of insulin.  Recommendation: A1C with Estimated Average Glucose: 11.2% in October  Endocrine follow up recent A1c 7.2  POCT Blood Glucose.: 92 <-- 82 <-- 221 mg/dL    Problem/Recommendation - 4:  ·  Problem: Mixed hyperlipidemia.  Recommendation: Lipid Profile in AM (10.20.20 @ 06:09)   Cholesterol, Serum: 226 mg/dL   Triglycerides, Serum: 384 mg/dL   HDL Cholesterol, Serum: 38 mg/dL   Direct LDL: 152 mg/dL  Continue statins Vascepa

## 2020-12-16 NOTE — PROGRESS NOTE ADULT - ATTENDING COMMENTS
Patient was seen and examined by me on 12/16/2020 ,interim events noted,labs and radiology studies reviewed.  Derik Murrieta MD,FACC.  [address] [place] [address].  [id number].  [phone]

## 2020-12-16 NOTE — DISCHARGE NOTE NURSING/CASE MANAGEMENT/SOCIAL WORK - PATIENT PORTAL LINK FT
You can access the FollowMyHealth Patient Portal offered by [facility] by registering at the following website: [email or web address]. By joining [facility]’s FollowMyHealth portal, you will also be able to view your health information using other applications (apps) compatible with our system.

## 2020-12-16 NOTE — DISCHARGE NOTE PROVIDER - NSDCMRMEDTOKEN_GEN_ALL_CORE_FT
aspirin 81 mg oral delayed release tablet: 1 tab(s) orally once a day  atorvastatin 40 mg oral tablet: 1 tab(s) orally once a day (at bedtime)  Brilinta (ticagrelor) 90 mg oral tablet: 1 tab(s) orally every 12 hours  metoprolol tartrate 25 mg oral tablet: 1 tab(s) orally 2 times a day  Synjardy 12.5 mg-1000 mg oral tablet: 1 tab(s) orally 2 times a day  Trulicity Pen 1.5 mg/0.5 mL subcutaneous solution: subcutaneously once a week Friday  Vascepa 1 g oral capsule: 2 cap(s) orally 2 times a day

## 2020-12-18 ENCOUNTER — TRANSCRIPTION ENCOUNTER (OUTPATIENT)
Age: 50
End: 2020-12-18

## 2021-01-26 ENCOUNTER — APPOINTMENT (OUTPATIENT)
Dept: ENDOCRINOLOGY | Facility: CLINIC | Age: 51
End: 2021-01-26

## 2022-02-10 NOTE — ED PROVIDER NOTE - CRITICAL CARE ATTESTATION
Soft, non-tender, no hepatosplenomegaly, normal bowel sounds negative I have personally provided the amount of critical care time documented below excluding time spent on separate procedures

## 2022-11-01 NOTE — DISCHARGE NOTE PROVIDER - CARE PROVIDERS DIRECT ADDRESSES
,DirectAddress_Unknown Hgb on admission 11.6, near baseline. Currently no signs of active bleeding (no hematochezia, melena, hemoptysis, hematuria).     Plan:  - Monitor for signs of bleeding  - Maintain active T&S  - Transfuse goal <7

## 2023-08-21 NOTE — ED PROVIDER NOTE - NEUROLOGICAL, MLM
De-rosario generalized tonic clonic movements on bilateral upper extremities with loss of consciousness. No acute processes on brain imaging. Currently on pressors. At the moment without medications related to decrease seizure threshold. Possibly related to acute electrolyte imbalance and YVES in the setting of CKD (hypophosphatemia, hypermagnesemia, hyponatremia). Due to the nature of first unprovoked seizures, will monitor with EEG. Currently not a candidate for AED.     Plan:   - Continue EEG. Pending results  - Optimize medications   - Optimize electrolytes (Mg, PO)  - Correct YVES in the setting of CKD for seizure threshold  - Monitor glucose levels   - Will signoff and follow EEG results peripherally.  - Please contact Neurology for any changes   Alert and oriented, no focal deficits, no motor or sensory deficits.

## 2023-10-13 PROBLEM — I10 ESSENTIAL (PRIMARY) HYPERTENSION: Chronic | Status: ACTIVE | Noted: 2020-12-15

## 2023-10-13 PROBLEM — E78.5 HYPERLIPIDEMIA, UNSPECIFIED: Chronic | Status: ACTIVE | Noted: 2020-12-15

## 2023-10-13 PROBLEM — I21.4 NON-ST ELEVATION (NSTEMI) MYOCARDIAL INFARCTION: Chronic | Status: ACTIVE | Noted: 2020-12-15

## 2023-10-13 PROBLEM — E11.9 TYPE 2 DIABETES MELLITUS WITHOUT COMPLICATIONS: Chronic | Status: ACTIVE | Noted: 2020-12-15

## 2023-10-13 PROBLEM — I25.10 ATHEROSCLEROTIC HEART DISEASE OF NATIVE CORONARY ARTERY WITHOUT ANGINA PECTORIS: Chronic | Status: ACTIVE | Noted: 2020-12-15

## 2023-10-13 PROBLEM — Z95.5 PRESENCE OF CORONARY ANGIOPLASTY IMPLANT AND GRAFT: Chronic | Status: ACTIVE | Noted: 2020-12-15

## 2023-10-18 ENCOUNTER — OUTPATIENT (OUTPATIENT)
Dept: OUTPATIENT SERVICES | Facility: HOSPITAL | Age: 53
LOS: 1 days | End: 2023-10-18
Payer: COMMERCIAL

## 2023-10-18 DIAGNOSIS — Z98.890 OTHER SPECIFIED POSTPROCEDURAL STATES: Chronic | ICD-10-CM

## 2023-10-18 DIAGNOSIS — I25.10 ATHEROSCLEROTIC HEART DISEASE OF NATIVE CORONARY ARTERY WITHOUT ANGINA PECTORIS: ICD-10-CM

## 2023-10-18 PROCEDURE — 78452 HT MUSCLE IMAGE SPECT MULT: CPT | Mod: MH

## 2023-10-18 PROCEDURE — A9502: CPT

## 2023-10-18 PROCEDURE — 93017 CV STRESS TEST TRACING ONLY: CPT

## 2024-02-07 NOTE — ED PROVIDER NOTE - CRTICAL CARE TIME SPENT (MIN)
Detail Level: Detailed Quality 431: Preventive Care And Screening: Unhealthy Alcohol Use - Screening: Patient not identified as an unhealthy alcohol user when screened for unhealthy alcohol use using a systematic screening method Quality 130: Documentation Of Current Medications In The Medical Record: Current Medications Documented Quality 111:Pneumonia Vaccination Status For Older Adults: Patient received any pneumococcal conjugate or polysaccharide vaccine on or after their 60th birthday and before the end of the measurement period Quality 226: Preventive Care And Screening: Tobacco Use: Screening And Cessation Intervention: Patient screened for tobacco use and is an ex/non-smoker Quality 110: Preventive Care And Screening: Influenza Immunization: Influenza Immunization Administered during Influenza season 45

## 2024-03-11 RX ORDER — ATORVASTATIN CALCIUM 80 MG/1
1 TABLET, FILM COATED ORAL
Qty: 90 | Refills: 0
Start: 2024-03-11 | End: 2024-06-08

## 2024-03-11 RX ORDER — DULAGLUTIDE 4.5 MG/.5ML
0.75 INJECTION, SOLUTION SUBCUTANEOUS
Qty: 4 | Refills: 0
Start: 2024-03-11 | End: 2024-06-08

## 2024-04-30 NOTE — ED ADULT NURSE NOTE - PRO INTERPRETER NEED 2
Hands Incubation Time: 2 Hours Neck Incubation Time: 1 Hour Debridement: No Photosensitizer: Ameluz Location: Face Pdt Type: KATHRYN-U Face And Scalp Incubation Time: 1 Hour for the face and 2 Hours for the scalp Consent: The procedure and risks were reviewed with the patient including but not limited to: burning, pigmentary changes, pain, blistering, scabbing, redness, and the possibility of needing numerous treatments. Strict photoprotection after the procedure was also discussed. Frequency Of Pdt: Two treatments 8 weeks apart Detail Level: Zone English

## 2024-05-07 ENCOUNTER — APPOINTMENT (OUTPATIENT)
Dept: ORTHOPEDIC SURGERY | Facility: CLINIC | Age: 54
End: 2024-05-07
Payer: OTHER MISCELLANEOUS

## 2024-05-07 VITALS — BODY MASS INDEX: 29.96 KG/M2 | WEIGHT: 214 LBS | HEIGHT: 71 IN

## 2024-05-07 PROCEDURE — 72170 X-RAY EXAM OF PELVIS: CPT

## 2024-05-07 PROCEDURE — 99205 OFFICE O/P NEW HI 60 MIN: CPT

## 2024-05-07 NOTE — ASSESSMENT
[FreeTextEntry1] : 54 year M with right hip bursitis and contusion - physical therapy , cannot take NSAIDs - tylenol  - Return in 6 weeks for follow up PRN for bursitis       Time Based billin minutes was spent with the patient today taking the patient's history, conducting a physical examination, reviewing imaging studies, and  detailed discussion regarding the diagnosis and treatment plan.

## 2024-05-07 NOTE — IMAGING
[de-identified] :  LOWER EXTREMITY/ RIGHT HIP EXAM Standing pelvic alignment: Symmetric with no Trendelenburg Atrophy: none Ecchymosis/swelling: none  Range of Motion Hip: Flexion/extension/ER/IR     / EX 20/ ER 45/ IR 10 / AB 60 /AD 20 Impingement with flexion adduction and internal rotation: negative Contracture: none Snapping hip: negative Greater trochanter: POS tenderness  Neurovascular Distal extremities: warm to touch Sensation to light touch: intact Muscle strength: 5/5  [id number] Xrays of the Right hip 3v were taken demonstrating no signs of fractures, dislocations. Mild OA right hip tonnis grade 1.

## 2024-05-07 NOTE — HISTORY OF PRESENT ILLNESS
[7] : 7 [5] : 5 [Dull/Aching] : dull/aching [Radiating] : radiating [Constant] : constant [de-identified] : 05/07/2024 Mr. ROBERTA BRANDT is a 54 year male that comes in today with a chief complaint of RIGHT HIP pt radiating from the lower bimal to the right leg. Pain in the buttocks and lateral sided. Pain can radiate down the leg. Pain is at rest. +PM injections in the past with relief (around bone) no PT in the past. 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  [] : no [FreeTextEntry7] : right leg

## 2024-05-23 ENCOUNTER — APPOINTMENT (OUTPATIENT)
Dept: ORTHOPEDIC SURGERY | Facility: CLINIC | Age: 54
End: 2024-05-23
Payer: OTHER MISCELLANEOUS

## 2024-05-23 VITALS — BODY MASS INDEX: 30.52 KG/M2 | WEIGHT: 218 LBS | HEIGHT: 71 IN

## 2024-05-23 DIAGNOSIS — E11.9 TYPE 2 DIABETES MELLITUS W/OUT COMPLICATIONS: ICD-10-CM

## 2024-05-23 DIAGNOSIS — M43.17 SPONDYLOLISTHESIS, LUMBOSACRAL REGION: ICD-10-CM

## 2024-05-23 DIAGNOSIS — M43.07 SPONDYLOLYSIS, LUMBOSACRAL REGION: ICD-10-CM

## 2024-05-23 PROCEDURE — 72110 X-RAY EXAM L-2 SPINE 4/>VWS: CPT

## 2024-05-23 PROCEDURE — 99205 OFFICE O/P NEW HI 60 MIN: CPT

## 2024-05-23 NOTE — WORK
[Sprain/Strain] : sprain/strain [Other: ___] : [unfilled] [Was the competent medical cause of the injury] : was the competent medical cause of the injury [Are consistent with the injury] : are consistent with the injury [Consistent with my objective findings] : consistent with my objective findings [Total (100%)] : total (100%) [Reveals pre-existing condition(s) that may affect treatment/prognosis] : reveals pre-existing condition(s) that may affect treatment/prognosis [Cannot return to work because ________] : cannot return to work because [unfilled] [FreeTextEntry2] : Spondylolysis

## 2024-05-23 NOTE — ASSESSMENT
[FreeTextEntry1] : Vacuum phenomenon on CT L5/S1 with chronic b/l pars defects Modic II changes L5/S1 with severe b/l NF stenosis and Gr II spondy   Discussed fusion PT

## 2024-05-23 NOTE — HISTORY OF PRESENT ILLNESS
[Lower back] : lower back [8] : 8 [1] : 2 [Constant] : constant [Rest] : rest [Physical therapy] : physical therapy [Walking] : walking [de-identified] : WC DOI: 08/07/2023 Occ: [facility].  11/22/23 CT and Lumbar MRI  [facility]- report noted in chart.   Ind. review- Vacuum phenomenon on CT L5/S1 with chronic b/l pars defects Modic II changes L5/S1 with severe b/l NF stenosis ================================================ Pt seen by non-spine partners in the past 05/23/2024: ROBERTA BRANDT is a 54 year y.o. male here today for lower back pain. Onset: 08/07/2023. 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 No numbness/tingling, no pain at rest, walking aggravates his back pain. Pain radiates down RLE to his right ankle. He visited pain management and received ?TPIs in the office in his back. No bb dysfunction. Denies similar type symptoms radiating down the RLE prior to described event. PT was somewhat helpful, ~36 visits thus far.    [] : no [FreeTextEntry7] : down to ankle [de-identified] : MRI

## 2024-05-23 NOTE — IMAGING
[de-identified] : LSPINE Inspection: No rash or ecchymosis Palpation: No tenderness to palpation or spasm in bilateral thoracic and lumbar paraspinal musculature, no SI joint tenderness to palpation ROM: limited all planes Strength: 5/5 bilateral hip flexors, knee extensors, ankle dorsiflexors, EHL, ankle plantarflexors Sensation: Sensation present to light touch bilateral L2-S1 distributions Provocative maneuvers: Negative L straight leg raise; + R SLR  *B/L* Hip- Palpation: Tenderness to palpation over greater trochanter and IT band ROM: No groin pain with flexion and internal rotation [Facet arthropathy] : Facet arthropathy [Disc space narrowing] : Disc space narrowing [Spondylolithesis] : Spondylolithesis [Spondylolysis] : Spondylolysis [AP] : anteroposterior [Mild arthritis (Tonnis Grade 1)] : Mild arthritis (Tonnis Grade 1)

## 2024-07-01 ENCOUNTER — APPOINTMENT (OUTPATIENT)
Dept: ORTHOPEDIC SURGERY | Facility: CLINIC | Age: 54
End: 2024-07-01
Payer: OTHER MISCELLANEOUS

## 2024-07-01 PROBLEM — M70.60 GREATER TROCHANTERIC BURSITIS, UNSPECIFIED LATERALITY: Status: ACTIVE | Noted: 2024-05-07

## 2024-07-01 PROCEDURE — 99215 OFFICE O/P EST HI 40 MIN: CPT

## 2024-07-01 RX ORDER — EMPAGLIFLOZIN 25 MG/1
TABLET, FILM COATED ORAL
Refills: 0 | Status: ACTIVE | COMMUNITY

## 2024-07-10 ENCOUNTER — APPOINTMENT (OUTPATIENT)
Dept: MRI IMAGING | Facility: CLINIC | Age: 54
End: 2024-07-10
Payer: OTHER MISCELLANEOUS

## 2024-07-10 PROCEDURE — 73721 MRI JNT OF LWR EXTRE W/O DYE: CPT | Mod: RT

## 2024-07-18 ENCOUNTER — APPOINTMENT (OUTPATIENT)
Dept: ORTHOPEDIC SURGERY | Facility: CLINIC | Age: 54
End: 2024-07-18
Payer: OTHER MISCELLANEOUS

## 2024-07-18 DIAGNOSIS — M43.07 SPONDYLOLYSIS, LUMBOSACRAL REGION: ICD-10-CM

## 2024-07-18 DIAGNOSIS — M43.17 SPONDYLOLISTHESIS, LUMBOSACRAL REGION: ICD-10-CM

## 2024-07-18 PROCEDURE — 99213 OFFICE O/P EST LOW 20 MIN: CPT

## 2024-07-22 ENCOUNTER — APPOINTMENT (OUTPATIENT)
Dept: ORTHOPEDIC SURGERY | Facility: CLINIC | Age: 54
End: 2024-07-22
Payer: OTHER MISCELLANEOUS

## 2024-07-22 DIAGNOSIS — M70.60 TROCHANTERIC BURSITIS, UNSPECIFIED HIP: ICD-10-CM

## 2024-07-22 PROCEDURE — 99215 OFFICE O/P EST HI 40 MIN: CPT

## 2024-07-22 NOTE — IMAGING
[de-identified] : LOWER EXTREMITY/ RIGHT HIP EXAM Standing pelvic alignment: Symmetric with no Trendelenburg Atrophy: none Ecchymosis/swelling: none  Range of Motion Hip: Flexion/extension/ER/IR     / EX 20/ ER 45/ IR 10 / AB 60 /AD 20 Impingement with flexion adduction and internal rotation: negative Contracture: none Snapping hip: negative Greater trochanter: POS tenderness  Neurovascular Distal extremities: warm to touch Sensation to light touch: intact Muscle strength: 5/5  [id number] Xrays of the Right hip 3v were taken demonstrating no signs of fractures, dislocations. Mild OA right hip tonnis grade 1.  2024 MRI Right Hip: 1. Moderate hip joint narrowing with superior labral tear, cartilage loss and marrow edema on both sides of the lateral joint and joint effusion. No fracture. 2. Gluteal tendinopathy with soft tissue edema.

## 2024-07-22 NOTE — HISTORY OF PRESENT ILLNESS
[7] : 7 [5] : 5 [Dull/Aching] : dull/aching [Radiating] : radiating [Constant] : constant [Work related] : work related [de-identified] : 07/01/2024: Patient didn't receive approval for PT, reports no changes in symptoms.  05/07/2024 Mr. ROBERTA BRANDT is a 54 year male that comes in today with a chief complaint of RIGHT HIP pt radiating from the lower bimal to the right leg. Pain in the buttocks and lateral sided. Pain can radiate down the leg. Pain is at rest. +PM injections in the past with relief (around bone) no PT in the past. 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   07/01/2024 continues to have pain on the right hip, No PT approved at this time.  7/22/24: Patient here to follow up on her RT hip and review MRI results. Pt states that he was unable to complete MRI, due to back pain. States symptoms are unchanged from prior. He was recommended to start PT, but has not received insurance auth as of yet.  [] : no [FreeTextEntry3] : 08/07/23 [FreeTextEntry7] : right leg

## 2024-07-22 NOTE — ASSESSMENT
[FreeTextEntry1] : 54 year M with right hip bursitis and contusion and abductor tendonitis - physical therapy , cannot take NSAIDs - tylenol  - Return in 6 weeks for follow up PRN for bursitis  2024 continue PT MRI of the right to r/o abductor tendon tear  2024 Mri with right hip narrowing, labral tearing, gluteal tendinopathy PT not approved yet consideration for CSI of right hip if no relief.    Time Based billin minutes was spent with the patient today taking the patient's history, conducting a physical examination, reviewing imaging studies, and  detailed discussion regarding the diagnosis and treatment plan.

## 2024-07-29 ENCOUNTER — APPOINTMENT (OUTPATIENT)
Dept: PAIN MANAGEMENT | Facility: CLINIC | Age: 54
End: 2024-07-29
Payer: OTHER MISCELLANEOUS

## 2024-07-29 VITALS — BODY MASS INDEX: 30.66 KG/M2 | HEIGHT: 71 IN | WEIGHT: 219 LBS

## 2024-07-29 DIAGNOSIS — M51.16 INTERVERTEBRAL DISC DISORDERS WITH RADICULOPATHY, LUMBAR REGION: ICD-10-CM

## 2024-07-29 DIAGNOSIS — M43.07 SPONDYLOLYSIS, LUMBOSACRAL REGION: ICD-10-CM

## 2024-07-29 DIAGNOSIS — Z83.3 FAMILY HISTORY OF DIABETES MELLITUS: ICD-10-CM

## 2024-07-29 DIAGNOSIS — M54.16 RADICULOPATHY, LUMBAR REGION: ICD-10-CM

## 2024-07-29 DIAGNOSIS — Z87.891 PERSONAL HISTORY OF NICOTINE DEPENDENCE: ICD-10-CM

## 2024-07-29 DIAGNOSIS — Z86.79 PERSONAL HISTORY OF OTHER DISEASES OF THE CIRCULATORY SYSTEM: ICD-10-CM

## 2024-07-29 DIAGNOSIS — Z86.39 PERSONAL HISTORY OF OTHER ENDOCRINE, NUTRITIONAL AND METABOLIC DISEASE: ICD-10-CM

## 2024-07-29 DIAGNOSIS — I25.10 ATHEROSCLEROTIC HEART DISEASE OF NATIVE CORONARY ARTERY W/OUT ANGINA PECTORIS: ICD-10-CM

## 2024-07-29 DIAGNOSIS — Z82.49 FAMILY HISTORY OF ISCHEMIC HEART DISEASE AND OTHER DISEASES OF THE CIRCULATORY SYSTEM: ICD-10-CM

## 2024-07-29 PROCEDURE — 99204 OFFICE O/P NEW MOD 45 MIN: CPT

## 2024-07-29 RX ORDER — TIRZEPATIDE 15 MG/.5ML
15 INJECTION, SOLUTION SUBCUTANEOUS
Refills: 0 | Status: ACTIVE | COMMUNITY

## 2024-07-29 RX ORDER — METOPROLOL TARTRATE 25 MG/1
25 TABLET, FILM COATED ORAL
Refills: 0 | Status: ACTIVE | COMMUNITY

## 2024-07-29 RX ORDER — TICAGRELOR 90 MG/1
90 TABLET ORAL
Refills: 0 | Status: ACTIVE | COMMUNITY

## 2024-07-29 RX ORDER — ATORVASTATIN CALCIUM 40 MG/1
40 TABLET, FILM COATED ORAL
Refills: 0 | Status: ACTIVE | COMMUNITY

## 2024-07-29 RX ORDER — ASPIRIN 81 MG
81 TABLET, DELAYED RELEASE (ENTERIC COATED) ORAL
Refills: 0 | Status: ACTIVE | COMMUNITY

## 2024-07-29 NOTE — DISCUSSION/SUMMARY
[de-identified] : WC - DOI 87/2023  ROBERTA BRANDT is a 54 year-old man presenting for a NPV for a history of chronic low back pain.   Prior treatment: Physical therapy x3 months Patient has participated and failed at least 6 weeks of conservative therapy including physical therapy and a prescribed home exercise program as well as 6 weeks of activity modifications, including heat, ice, rest, and over the counter medications.  Plan: 1) MRI lumbar spine images reviewed with the patient. 2) Schedule RIGHT L5-S1, S1 TFESI w/o MAC. Patient is on [facility] - Cardiologist Dr. Alvin Murrieta [phone]. Will need permission to hold prior to procedure. The procedure was explained to the patient in detail. Reviewed risks, benefits, and alternatives with the patient. Some risks discussed included temporary increase in pain, bleeding, infection, and side effects from steroids. The patient expressed understanding and would like to proceed. Patient also take mounjaro, but will do this w/o MAC.  3) Continue physical therapy 4) RTC post procedure  Patient has temporary partial disability, he has not stopped working.

## 2024-07-29 NOTE — PHYSICAL EXAM
[de-identified] : Gen: NAD Head: NC/AT Eyes: no glasses, no scleral icterus ENT: mucous membranes moist CV: No JVD Lungs: nonlabored breathing Abd: soft, NT/ND Ext: full ROM in all extremities, no peripheral edema Back: +TTP in the right low lumbar facet region, +SLR on the right, +facet loading on the right Neuro: CN intact LEs +5 L +5 R hip flexion +5 L +5 R leg extension +5 L +5 R leg flexion +5 L +5 R foot dorsiflexion +5 L +5 R foot plantarflexion +5 L +5 R EHL extension Psych: normal affect Skin: no visible lesions

## 2024-07-29 NOTE — PHYSICAL EXAM
[de-identified] : Gen: NAD Head: NC/AT Eyes: no glasses, no scleral icterus ENT: mucous membranes moist CV: No JVD Lungs: nonlabored breathing Abd: soft, NT/ND Ext: full ROM in all extremities, no peripheral edema Back: +TTP in the right low lumbar facet region, +SLR on the right, +facet loading on the right Neuro: CN intact LEs +5 L +5 R hip flexion +5 L +5 R leg extension +5 L +5 R leg flexion +5 L +5 R foot dorsiflexion +5 L +5 R foot plantarflexion +5 L +5 R EHL extension Psych: normal affect Skin: no visible lesions

## 2024-07-29 NOTE — DISCUSSION/SUMMARY
[de-identified] : WC - DOI 87/2023  ROBERTA BRANDT is a 54 year-old man presenting for a NPV for a history of chronic low back pain.   Prior treatment: Physical therapy x3 months Patient has participated and failed at least 6 weeks of conservative therapy including physical therapy and a prescribed home exercise program as well as 6 weeks of activity modifications, including heat, ice, rest, and over the counter medications.  Plan: 1) MRI lumbar spine images reviewed with the patient. 2) Schedule RIGHT L5-S1, S1 TFESI w/o MAC. Patient is on [facility] - Cardiologist Dr. Alvin Murrieta [phone]. Will need permission to hold prior to procedure. The procedure was explained to the patient in detail. Reviewed risks, benefits, and alternatives with the patient. Some risks discussed included temporary increase in pain, bleeding, infection, and side effects from steroids. The patient expressed understanding and would like to proceed. Patient also take mounjaro, but will do this w/o MAC.  3) Continue physical therapy 4) RTC post procedure  Patient has temporary partial disability, he has not stopped working.

## 2024-07-29 NOTE — DATA REVIEWED
[MRI] : MRI [Lumbar Spine] : lumbar spine [Report was reviewed and noted in the chart] : The report was reviewed and noted in the chart [I independently reviewed and interpreted images and report] : I independently reviewed and interpreted images and report [FreeTextEntry1] : 11/24/2023 MRI Lumbar Spine - [facility] IMPRESSION:    1. Grade 2 anterolisthesis of L5 over S1 with superimposed degenerative changes contributing to severe bilateral neural foraminal stenosis. There is uncovering of the disc with contact on traversing S1 nerve roots but no significant dural compression is identified. Fluid signal is present in the bilateral pars defects.   2. No other significant canal or neural foraminal stenosis at the other lumbar levels.     CLINICAL INDICATION: Low back pain   TECHNIQUE: Multiplanar multisequence MRI of the lumbar spine was performed without the administration of intravenous contrast, according to standard protocol.    COMPARISON: CT lumbar spine without contrast performed on the same date and separately dictated   FINDINGS:   ALIGNMENT:  -There is grade 2 anterolisthesis of L5 over S1 with bilateral L5 spondylolysis. -Lumbar lordosis is mildly straightened. -Mild right convex curvature of the lumbar spine with apex at L3-4.   VERTEBRAE:  -Vertebral heights are maintained. No acute fracture. -No focal suspicious bone marrow signal abnormality.   DISCS:  -Severe disc height loss and disc desiccation at L5-S1 with adjacent Modic type II degenerative endplate change. -Minimal disc desiccation at L3-4 and L4-5. -Remaining intervertebral disc heights are preserved.   CONUS MEDULLARIS AND CAUDA EQUINA: The conus medullaris terminates at L1-2. No signal abnormalities in the visualized distal cord or conus.   PARAVERTEBRAL SOFT TISSUES AND VISUALIZED RETROPERITONEUM: There are no acute abnormalities in the visualized portions on this nondedicated study.   EVALUATION OF INDIVIDUAL LEVELS:    T12-L1: No significant canal or neural foraminal stenosis.   L1-2: No significant canal or neural foraminal stenosis.   L2-3: No significant canal or neural foraminal stenosis.   L3-4: No significant canal or neural foraminal stenosis.   L4-5: Minimal facet hypertrophy. No significant canal or neural foraminal stenosis.   L5-S1: Uncovering of the disc. Mild broad-based disc bulge eccentric to the foraminal zones, right greater than left. There is moderate facet hypertrophy. There is fluid signal present in the bilateral pars defects. Severe bilateral neural foraminal stenosis. Disc abuts traversing S1 nerve roots but there is no dural compression.   LIMITED EVALUATION OF UPPER SACRUM AND SACROILIAC JOINTS: Unremarkable.  11/24/2023 CT Lumbar Spine - [facility] IMPRESSION:    Grade 2 anterolisthesis of L5 over S1 with bilateral L5 spondylolysis and degenerative endplate change at L5-S1.   Reference to the separately dictated MRI of the lumbar spine for further description and detailed level by level analysis the degree of canal and neural foraminal stenosis.   Incidentally noted punctate nonobstructing renal calculi.     CLINICAL INDICATION: Low back pain   TECHNIQUE: High-resolution multi-detector three-dimensional CT of the lumbar spine was performed without the administration of intravenous contrast, according to standard protocol. Multiplanar reformations were reviewed, including axial, sagittal, and coronal plane images.    COMPARISON: Separately dictated MRI lumbar spine without contrast from the same date   FINDINGS:   ALIGNMENT:  Grade 2 anterolisthesis of L5 over S1 with bilateral L5 spondylolysis. Right convex curvature of the lumbar spine with apex at L3. Lumbar lordosis is preserved.   VERTEBRAE: Vertebral heights are maintained. No acute fracture. No focal sclerotic or lytic lesions.   DISCS: Moderate to severe disc height loss at L5-S1 with vacuum disc phenomenon and degenerative endplate change. Remaining intervertebral disc heights are preserved.   PARAVERTEBRAL SOFT TISSUES AND VISUALIZED RETROPERITONEUM:  -There are calcifications of the the abdominal aorta. -Punctate nonobstructing left lower pole renal calculus (series 7, image 19) and right upper pole renal calculus (series 13, image 6   EVALUATION OF INDIVIDUAL LEVELS DEMONSTRATES:    Anterior osteophytes present at T11-12 and T12-L1.   Reference to the separately dictated MRI of the lumbar spine for further description and detailed level by level analysis the degree of canal and neural foraminal stenosis.   LIMITED EVALUATION OF UPPER SACRUM AND SACROILIAC JOINTS: No joint width alterations or acute abnormalities demonstrated.

## 2024-07-29 NOTE — PHYSICAL EXAM
[de-identified] : Gen: NAD Head: NC/AT Eyes: no glasses, no scleral icterus ENT: mucous membranes moist CV: No JVD Lungs: nonlabored breathing Abd: soft, NT/ND Ext: full ROM in all extremities, no peripheral edema Back: +TTP in the right low lumbar facet region, +SLR on the right, +facet loading on the right Neuro: CN intact LEs +5 L +5 R hip flexion +5 L +5 R leg extension +5 L +5 R leg flexion +5 L +5 R foot dorsiflexion +5 L +5 R foot plantarflexion +5 L +5 R EHL extension Psych: normal affect Skin: no visible lesions

## 2024-07-29 NOTE — HISTORY OF PRESENT ILLNESS
[Lower back] : lower back [Work related] : work related [8] : 8 [2] : 2 [Sharp] : sharp [Shooting] : shooting [Throbbing] : throbbing [Tightness] : tightness [Tingling] : tingling [Intermittent] : intermittent [Household chores] : household chores [Sleep] : sleep [Social interactions] : social interactions [Rest] : rest [Nothing helps with pain getting better] : Nothing helps with pain getting better [Standing] : standing [Walking] : walking [FreeTextEntry1] : WC - DOI 87/2023 7/29/2024 ROBERTA BRANDT is a 54 year-old man presenting for a NPV for a history of chronic low back pain. The patient states that he was pushing a cart forward and somehow the cart toppled over and fell onto his back. He started to have back pain since that time. The patient did not stop working. He did undergo treatment with PT which provided modest relief. At this point, the patient notes an aching pain in the right low back with radiation to the right posterior thigh and posterior leg and dorsal foot. He notes tingling and numbness. No weakness.   The patient states that average pain over the past week was 8/10 in severity. Mood: No depression or anxiety.  Sleep: Patient notes occasional difficulty with sleep 2/2 pain.  [] : no [FreeTextEntry3] : 08/07/2023 [FreeTextEntry7] : rt leg

## 2024-08-29 ENCOUNTER — APPOINTMENT (OUTPATIENT)
Dept: ORTHOPEDIC SURGERY | Facility: CLINIC | Age: 54
End: 2024-08-29
Payer: OTHER MISCELLANEOUS

## 2024-08-29 DIAGNOSIS — M51.16 INTERVERTEBRAL DISC DISORDERS WITH RADICULOPATHY, LUMBAR REGION: ICD-10-CM

## 2024-08-29 PROCEDURE — 99213 OFFICE O/P EST LOW 20 MIN: CPT

## 2024-08-29 NOTE — ASSESSMENT
[FreeTextEntry1] : Vacuum phenomenon on CT L5/S1 with chronic b/l pars defects Modic II changes L5/S1 with severe b/l NF stenosis and Gr II spondy   Continue with pain management for LESI Patient would benefit from continued physical therapy as he has not reached MMI Follow up 2 months

## 2024-08-29 NOTE — IMAGING
[de-identified] : LSPINE Inspection: No rash or ecchymosis Palpation: No tenderness to palpation or spasm in bilateral thoracic and lumbar paraspinal musculature, no SI joint tenderness to palpation ROM: limited all planes Strength: 5/5 bilateral hip flexors, knee extensors, ankle dorsiflexors, EHL, ankle plantarflexors Sensation: Sensation present to light touch bilateral L2-S1 distributions Provocative maneuvers: Negative L straight leg raise; + R SLR  *B/L* Hip- Palpation: Tenderness to palpation over greater trochanter and IT band ROM: No groin pain with flexion and internal rotation

## 2024-08-29 NOTE — HISTORY OF PRESENT ILLNESS
[Lower back] : lower back [8] : 8 [1] : 2 [Constant] : constant [Rest] : rest [Physical therapy] : physical therapy [Walking] : walking [de-identified] : WC DOI: 08/07/2023 Occ: [facility].  11/22/23 CT and Lumbar MRI  [facility]- report noted in chart.   Ind. review- Vacuum phenomenon on CT L5/S1 with chronic b/l pars defects Modic II changes L5/S1 with severe b/l NF stenosis ================================================ Pt seen by non-spine partners in the past 05/23/2024: ROBERTA BRANDT is a 54 year y.o. male here today for lower back pain. Onset: 08/07/2023. 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 No numbness/tingling, no pain at rest, walking aggravates his back pain. Pain radiates down RLE to his right ankle. He visited pain management and received ?TPIs in the office in his back. No bb dysfunction. Denies similar type symptoms radiating down the RLE prior to described event. PT was somewhat helpful, ~36 visits thus far.   07/18/2024 ROBERTA 54 year M is here today to follow up on lower back pain. Patient finished his PT sessions. Patient reports slight improvement with pain since last visit, although pain persists. Radiation down RLE posteriorly to foot. No b/b dysfunction reported.   08/29/2024: Patient is here to follow up on LBP. Patient reports PT was denied after last visit, has seen Dr. Figueroa and will schedule procedure for epidural injection. Patient reports no improvement in symptoms.    [] : no [FreeTextEntry7] : down to ankle [de-identified] : MRI

## 2024-09-17 ENCOUNTER — APPOINTMENT (OUTPATIENT)
Dept: PAIN MANAGEMENT | Facility: CLINIC | Age: 54
End: 2024-09-17
Payer: OTHER MISCELLANEOUS

## 2024-09-17 DIAGNOSIS — M54.16 RADICULOPATHY, LUMBAR REGION: ICD-10-CM

## 2024-09-17 DIAGNOSIS — M51.16 INTERVERTEBRAL DISC DISORDERS WITH RADICULOPATHY, LUMBAR REGION: ICD-10-CM

## 2024-09-17 PROCEDURE — 64483 NJX AA&/STRD TFRM EPI L/S 1: CPT | Mod: RT

## 2024-09-17 PROCEDURE — 64484 NJX AA&/STRD TFRM EPI L/S EA: CPT | Mod: RT

## 2024-09-17 NOTE — PROCEDURE
[FreeTextEntry1] : RIGHT L5-S1, S1 transforaminal epidural steroid injection [FreeTextEntry2] : chronic lumbar radiculopathy [FreeTextEntry3] : Procedure Date: 09/17/2024   Preoperative Diagnosis: chronic lumbar radiculopathy   Procedure: RIGHT L5-S1, S1 transforaminal epidural steroid injection under fluoroscopic guidance  Anesthesia: local  Complications: none   EBL: none   Procedure in detail:   Patient was seen and examined. Risks, benefits, and alternatives for the procedure were discussed with the patient in detail. The patient expressed understanding, gave written and verbal consent, and placed themselves in a prone position on the procedure table. Skin overlying the lumbosacral spine was prepped with chloraprep and draped in the usual sterile fashion. Fluoroscopic images were obtained to identify the L5 and S1 vertebral bodies. Target was the 6 o'clock position under the RIGHT pedicle at the L5 and S1 vertebral level. Skin overlying the target was marked and infiltrated with 1% lidocaine. Using two 22 gauge, 5 inch spinal needles, these were inserted and advanced under intermittent fluoroscopic guidance. When felt to be engaged in the epidural space, lateral view was used to confirm depth. After negative aspiration for heme/csf, contrast was injected under live fluoroscopy, which showed contrast spread consistent with epidural flow. No evidence of intravascular or intrathecal uptake. At this point, a total of 2ml of injectate, which consisted of 1ml of 6mg/ml betamethasone and 1ml of normal saline were injected through each needle. The needles were restyletted and withdrawn, a band aid was placed over the injection site. Patient tolerated the procedure well. The patient recovered uneventfully and was discharged home in stable condition.

## 2024-09-30 ENCOUNTER — APPOINTMENT (OUTPATIENT)
Dept: ORTHOPEDIC SURGERY | Facility: CLINIC | Age: 54
End: 2024-09-30
Payer: OTHER MISCELLANEOUS

## 2024-09-30 DIAGNOSIS — M70.60 TROCHANTERIC BURSITIS, UNSPECIFIED HIP: ICD-10-CM

## 2024-09-30 PROCEDURE — 99213 OFFICE O/P EST LOW 20 MIN: CPT

## 2024-09-30 NOTE — HISTORY OF PRESENT ILLNESS
[Work related] : work related [7] : 7 [5] : 5 [Dull/Aching] : dull/aching [Radiating] : radiating [Constant] : constant [de-identified] : 07/01/2024: Patient didn't receive approval for PT, reports no changes in symptoms.  05/07/2024 Mr. ROBERTA BRANDT is a 54 year male that comes in today with a chief complaint of RIGHT HIP pt radiating from the lower bimal to the right leg. Pain in the buttocks and lateral sided. Pain can radiate down the leg. Pain is at rest. +PM injections in the past with relief (around bone) no PT in the past. 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   07/01/2024 continues to have pain on the right hip, No PT approved at this time.  7/22/24: Patient here to follow up on her RT hip and review MRI results. Pt states that he was unable to complete MRI, due to back pain. States symptoms are unchanged from prior. He was recommended to start PT, but has not received insurance auth as of yet.   09/30/2024: Patient is here to follow up on right hip, reports PT has not been approved. Patient reports pain at night when turning, no improvement since last visit. Still continuing to have pain. Had ASAEL with relief.  [] : no [FreeTextEntry3] : 08/07/23 [FreeTextEntry7] : right leg

## 2024-09-30 NOTE — ASSESSMENT
[FreeTextEntry1] : 54 year M with right hip bursitis and contusion and abductor tendonitis - physical therapy , cannot take NSAIDs - tylenol  - Return in 6 weeks for follow up PRN for bursitis  07/01/2024 continue PT MRI of the right to r/o abductor tendon tear  07/22/2024 Mri with right hip narrowing, labral tearing, gluteal tendinopathy PT not approved yet consideration for CSI of right hip if no relief.  09/30/2024:  PT rx placed Will discuss hip CSI with Dr. Figueroa

## 2024-09-30 NOTE — IMAGING
[de-identified] : LOWER EXTREMITY/ RIGHT HIP EXAM Standing pelvic alignment: Symmetric with no Trendelenburg Atrophy: none Ecchymosis/swelling: none  Range of Motion Hip: Flexion/extension/ER/IR     / EX 20/ ER 45/ IR 10 / AB 60 /AD 20 Impingement with flexion adduction and internal rotation: negative Contracture: none Snapping hip: negative Greater trochanter: POS tenderness  Neurovascular Distal extremities: warm to touch Sensation to light touch: intact Muscle strength: 5/5  [id number] Xrays of the Right hip 3v were taken demonstrating no signs of fractures, dislocations. Mild OA right hip tonnis grade 1.  2024 MRI Right Hip: 1. Moderate hip joint narrowing with superior labral tear, cartilage loss and marrow edema on both sides of the lateral joint and joint effusion. No fracture. 2. Gluteal tendinopathy with soft tissue edema.

## 2024-10-21 ENCOUNTER — APPOINTMENT (OUTPATIENT)
Dept: PAIN MANAGEMENT | Facility: CLINIC | Age: 54
End: 2024-10-21
Payer: OTHER MISCELLANEOUS

## 2024-10-21 VITALS — WEIGHT: 219 LBS | HEIGHT: 71 IN | BODY MASS INDEX: 30.66 KG/M2

## 2024-10-21 DIAGNOSIS — M25.551 PAIN IN RIGHT HIP: ICD-10-CM

## 2024-10-21 PROCEDURE — 99214 OFFICE O/P EST MOD 30 MIN: CPT

## 2024-10-24 ENCOUNTER — APPOINTMENT (OUTPATIENT)
Dept: ORTHOPEDIC SURGERY | Facility: CLINIC | Age: 54
End: 2024-10-24
Payer: OTHER MISCELLANEOUS

## 2024-10-24 DIAGNOSIS — M54.16 RADICULOPATHY, LUMBAR REGION: ICD-10-CM

## 2024-10-24 DIAGNOSIS — M43.07 SPONDYLOLYSIS, LUMBOSACRAL REGION: ICD-10-CM

## 2024-10-24 DIAGNOSIS — M51.16 INTERVERTEBRAL DISC DISORDERS WITH RADICULOPATHY, LUMBAR REGION: ICD-10-CM

## 2024-10-24 PROCEDURE — 99213 OFFICE O/P EST LOW 20 MIN: CPT

## 2024-11-04 ENCOUNTER — APPOINTMENT (OUTPATIENT)
Dept: ORTHOPEDIC SURGERY | Facility: CLINIC | Age: 54
End: 2024-11-04
Payer: OTHER MISCELLANEOUS

## 2024-11-04 DIAGNOSIS — M25.551 PAIN IN RIGHT HIP: ICD-10-CM

## 2024-11-04 DIAGNOSIS — M70.60 TROCHANTERIC BURSITIS, UNSPECIFIED HIP: ICD-10-CM

## 2024-11-04 PROCEDURE — 99215 OFFICE O/P EST HI 40 MIN: CPT

## 2024-11-18 ENCOUNTER — APPOINTMENT (OUTPATIENT)
Dept: PAIN MANAGEMENT | Facility: CLINIC | Age: 54
End: 2024-11-18
Payer: OTHER MISCELLANEOUS

## 2024-11-18 VITALS — WEIGHT: 218 LBS | BODY MASS INDEX: 30.52 KG/M2 | HEIGHT: 71 IN

## 2024-11-18 DIAGNOSIS — M51.16 INTERVERTEBRAL DISC DISORDERS WITH RADICULOPATHY, LUMBAR REGION: ICD-10-CM

## 2024-11-18 DIAGNOSIS — M25.551 PAIN IN RIGHT HIP: ICD-10-CM

## 2024-11-18 DIAGNOSIS — M43.07 SPONDYLOLYSIS, LUMBOSACRAL REGION: ICD-10-CM

## 2024-11-18 DIAGNOSIS — M70.60 TROCHANTERIC BURSITIS, UNSPECIFIED HIP: ICD-10-CM

## 2024-11-18 DIAGNOSIS — M54.16 RADICULOPATHY, LUMBAR REGION: ICD-10-CM

## 2024-11-18 PROCEDURE — 99214 OFFICE O/P EST MOD 30 MIN: CPT

## 2024-12-19 ENCOUNTER — APPOINTMENT (OUTPATIENT)
Dept: ORTHOPEDIC SURGERY | Facility: CLINIC | Age: 54
End: 2024-12-19
Payer: OTHER MISCELLANEOUS

## 2024-12-19 DIAGNOSIS — M43.07 SPONDYLOLYSIS, LUMBOSACRAL REGION: ICD-10-CM

## 2024-12-19 DIAGNOSIS — M43.17 SPONDYLOLISTHESIS, LUMBOSACRAL REGION: ICD-10-CM

## 2024-12-19 PROCEDURE — 99214 OFFICE O/P EST MOD 30 MIN: CPT

## 2024-12-26 ENCOUNTER — APPOINTMENT (OUTPATIENT)
Dept: PAIN MANAGEMENT | Facility: CLINIC | Age: 54
End: 2024-12-26
Payer: OTHER MISCELLANEOUS

## 2024-12-26 DIAGNOSIS — M54.16 RADICULOPATHY, LUMBAR REGION: ICD-10-CM

## 2024-12-26 DIAGNOSIS — M51.16 INTERVERTEBRAL DISC DISORDERS WITH RADICULOPATHY, LUMBAR REGION: ICD-10-CM

## 2024-12-26 PROCEDURE — 64483 NJX AA&/STRD TFRM EPI L/S 1: CPT | Mod: RT

## 2024-12-26 PROCEDURE — 82948 REAGENT STRIP/BLOOD GLUCOSE: CPT

## 2024-12-26 PROCEDURE — 64484 NJX AA&/STRD TFRM EPI L/S EA: CPT | Mod: 59,RT

## 2024-12-30 ENCOUNTER — APPOINTMENT (OUTPATIENT)
Dept: ORTHOPEDIC SURGERY | Facility: CLINIC | Age: 54
End: 2024-12-30
Payer: OTHER MISCELLANEOUS

## 2024-12-30 DIAGNOSIS — M16.11 UNILATERAL PRIMARY OSTEOARTHRITIS, RIGHT HIP: ICD-10-CM

## 2024-12-30 PROCEDURE — 99215 OFFICE O/P EST HI 40 MIN: CPT

## 2025-01-13 ENCOUNTER — APPOINTMENT (OUTPATIENT)
Dept: PAIN MANAGEMENT | Facility: CLINIC | Age: 55
End: 2025-01-13
Payer: OTHER MISCELLANEOUS

## 2025-01-13 VITALS — BODY MASS INDEX: 31.22 KG/M2 | WEIGHT: 223 LBS | HEIGHT: 71 IN

## 2025-01-13 DIAGNOSIS — M25.551 PAIN IN RIGHT HIP: ICD-10-CM

## 2025-01-13 DIAGNOSIS — M16.11 UNILATERAL PRIMARY OSTEOARTHRITIS, RIGHT HIP: ICD-10-CM

## 2025-01-13 DIAGNOSIS — M54.16 RADICULOPATHY, LUMBAR REGION: ICD-10-CM

## 2025-01-13 DIAGNOSIS — M51.16 INTERVERTEBRAL DISC DISORDERS WITH RADICULOPATHY, LUMBAR REGION: ICD-10-CM

## 2025-01-13 PROCEDURE — 99214 OFFICE O/P EST MOD 30 MIN: CPT

## 2025-02-10 ENCOUNTER — APPOINTMENT (OUTPATIENT)
Dept: ORTHOPEDIC SURGERY | Facility: CLINIC | Age: 55
End: 2025-02-10

## 2025-02-11 ENCOUNTER — APPOINTMENT (OUTPATIENT)
Dept: PAIN MANAGEMENT | Facility: CLINIC | Age: 55
End: 2025-02-11

## 2025-02-11 DIAGNOSIS — M25.551 PAIN IN RIGHT HIP: ICD-10-CM

## 2025-02-11 DIAGNOSIS — M16.11 UNILATERAL PRIMARY OSTEOARTHRITIS, RIGHT HIP: ICD-10-CM

## 2025-02-11 PROCEDURE — 20610 DRAIN/INJ JOINT/BURSA W/O US: CPT | Mod: RT

## 2025-02-11 PROCEDURE — J3490M: CUSTOM

## 2025-02-20 ENCOUNTER — APPOINTMENT (OUTPATIENT)
Dept: ORTHOPEDIC SURGERY | Facility: CLINIC | Age: 55
End: 2025-02-20
Payer: OTHER MISCELLANEOUS

## 2025-02-20 DIAGNOSIS — M43.17 SPONDYLOLISTHESIS, LUMBOSACRAL REGION: ICD-10-CM

## 2025-02-20 PROCEDURE — 99214 OFFICE O/P EST MOD 30 MIN: CPT

## 2025-03-10 ENCOUNTER — APPOINTMENT (OUTPATIENT)
Dept: PAIN MANAGEMENT | Facility: CLINIC | Age: 55
End: 2025-03-10
Payer: OTHER MISCELLANEOUS

## 2025-03-10 VITALS — BODY MASS INDEX: 31.22 KG/M2 | WEIGHT: 223 LBS | HEIGHT: 71 IN

## 2025-03-10 DIAGNOSIS — M51.16 INTERVERTEBRAL DISC DISORDERS WITH RADICULOPATHY, LUMBAR REGION: ICD-10-CM

## 2025-03-10 DIAGNOSIS — M54.16 RADICULOPATHY, LUMBAR REGION: ICD-10-CM

## 2025-03-10 DIAGNOSIS — M16.11 UNILATERAL PRIMARY OSTEOARTHRITIS, RIGHT HIP: ICD-10-CM

## 2025-03-10 PROCEDURE — 99214 OFFICE O/P EST MOD 30 MIN: CPT

## 2025-04-17 ENCOUNTER — APPOINTMENT (OUTPATIENT)
Dept: ORTHOPEDIC SURGERY | Facility: CLINIC | Age: 55
End: 2025-04-17
Payer: OTHER MISCELLANEOUS

## 2025-04-17 VITALS — HEIGHT: 71 IN | WEIGHT: 223 LBS | BODY MASS INDEX: 31.22 KG/M2

## 2025-04-17 DIAGNOSIS — M51.16 INTERVERTEBRAL DISC DISORDERS WITH RADICULOPATHY, LUMBAR REGION: ICD-10-CM

## 2025-04-17 DIAGNOSIS — M43.17 SPONDYLOLISTHESIS, LUMBOSACRAL REGION: ICD-10-CM

## 2025-04-17 PROCEDURE — 99213 OFFICE O/P EST LOW 20 MIN: CPT

## 2025-05-02 ENCOUNTER — APPOINTMENT (OUTPATIENT)
Dept: PAIN MANAGEMENT | Facility: CLINIC | Age: 55
End: 2025-05-02
Payer: OTHER MISCELLANEOUS

## 2025-05-02 DIAGNOSIS — M54.16 RADICULOPATHY, LUMBAR REGION: ICD-10-CM

## 2025-05-02 DIAGNOSIS — M51.16 INTERVERTEBRAL DISC DISORDERS WITH RADICULOPATHY, LUMBAR REGION: ICD-10-CM

## 2025-05-02 PROCEDURE — 64484 NJX AA&/STRD TFRM EPI L/S EA: CPT | Mod: RT,59

## 2025-05-02 PROCEDURE — 82948 REAGENT STRIP/BLOOD GLUCOSE: CPT

## 2025-05-02 PROCEDURE — 64483 NJX AA&/STRD TFRM EPI L/S 1: CPT | Mod: RT

## 2025-05-12 ENCOUNTER — APPOINTMENT (OUTPATIENT)
Dept: ORTHOPEDIC SURGERY | Facility: CLINIC | Age: 55
End: 2025-05-12
Payer: OTHER MISCELLANEOUS

## 2025-05-12 DIAGNOSIS — M16.11 UNILATERAL PRIMARY OSTEOARTHRITIS, RIGHT HIP: ICD-10-CM

## 2025-05-12 PROCEDURE — 99213 OFFICE O/P EST LOW 20 MIN: CPT

## 2025-05-29 ENCOUNTER — APPOINTMENT (OUTPATIENT)
Dept: ORTHOPEDIC SURGERY | Facility: CLINIC | Age: 55
End: 2025-05-29
Payer: OTHER MISCELLANEOUS

## 2025-05-29 DIAGNOSIS — M43.17 SPONDYLOLISTHESIS, LUMBOSACRAL REGION: ICD-10-CM

## 2025-05-29 PROCEDURE — 99214 OFFICE O/P EST MOD 30 MIN: CPT

## 2025-06-02 ENCOUNTER — APPOINTMENT (OUTPATIENT)
Dept: PAIN MANAGEMENT | Facility: CLINIC | Age: 55
End: 2025-06-02
Payer: OTHER MISCELLANEOUS

## 2025-06-02 VITALS — HEIGHT: 71 IN | BODY MASS INDEX: 29.96 KG/M2 | WEIGHT: 214 LBS

## 2025-06-02 DIAGNOSIS — M16.11 UNILATERAL PRIMARY OSTEOARTHRITIS, RIGHT HIP: ICD-10-CM

## 2025-06-02 DIAGNOSIS — M70.60 TROCHANTERIC BURSITIS, UNSPECIFIED HIP: ICD-10-CM

## 2025-06-02 DIAGNOSIS — M51.16 INTERVERTEBRAL DISC DISORDERS WITH RADICULOPATHY, LUMBAR REGION: ICD-10-CM

## 2025-06-02 DIAGNOSIS — M43.07 SPONDYLOLYSIS, LUMBOSACRAL REGION: ICD-10-CM

## 2025-06-02 PROCEDURE — 99214 OFFICE O/P EST MOD 30 MIN: CPT

## 2025-07-14 ENCOUNTER — APPOINTMENT (OUTPATIENT)
Dept: ORTHOPEDIC SURGERY | Facility: CLINIC | Age: 55
End: 2025-07-14
Payer: OTHER MISCELLANEOUS

## 2025-07-14 PROCEDURE — 99215 OFFICE O/P EST HI 40 MIN: CPT

## 2025-07-24 ENCOUNTER — APPOINTMENT (OUTPATIENT)
Dept: ORTHOPEDIC SURGERY | Facility: CLINIC | Age: 55
End: 2025-07-24
Payer: OTHER MISCELLANEOUS

## 2025-07-24 PROCEDURE — 99213 OFFICE O/P EST LOW 20 MIN: CPT

## 2025-07-28 ENCOUNTER — APPOINTMENT (OUTPATIENT)
Dept: PAIN MANAGEMENT | Facility: CLINIC | Age: 55
End: 2025-07-28
Payer: OTHER MISCELLANEOUS

## 2025-07-28 VITALS — HEIGHT: 71 IN | BODY MASS INDEX: 29.96 KG/M2 | WEIGHT: 214 LBS

## 2025-07-28 DIAGNOSIS — M16.11 UNILATERAL PRIMARY OSTEOARTHRITIS, RIGHT HIP: ICD-10-CM

## 2025-07-28 DIAGNOSIS — M43.07 SPONDYLOLYSIS, LUMBOSACRAL REGION: ICD-10-CM

## 2025-07-28 DIAGNOSIS — M25.551 PAIN IN RIGHT HIP: ICD-10-CM

## 2025-07-28 DIAGNOSIS — M43.17 SPONDYLOLISTHESIS, LUMBOSACRAL REGION: ICD-10-CM

## 2025-07-28 DIAGNOSIS — M54.16 RADICULOPATHY, LUMBAR REGION: ICD-10-CM

## 2025-07-28 DIAGNOSIS — M51.16 INTERVERTEBRAL DISC DISORDERS WITH RADICULOPATHY, LUMBAR REGION: ICD-10-CM

## 2025-07-28 PROCEDURE — 99214 OFFICE O/P EST MOD 30 MIN: CPT

## 2025-09-09 ENCOUNTER — APPOINTMENT (OUTPATIENT)
Dept: PAIN MANAGEMENT | Facility: CLINIC | Age: 55
End: 2025-09-09

## 2025-09-09 DIAGNOSIS — M51.16 INTERVERTEBRAL DISC DISORDERS WITH RADICULOPATHY, LUMBAR REGION: ICD-10-CM

## 2025-09-09 PROCEDURE — 64484 NJX AA&/STRD TFRM EPI L/S EA: CPT | Mod: 59,RT

## 2025-09-09 PROCEDURE — 64483 NJX AA&/STRD TFRM EPI L/S 1: CPT | Mod: RT
